# Patient Record
Sex: FEMALE | Race: WHITE | NOT HISPANIC OR LATINO | Employment: OTHER | URBAN - METROPOLITAN AREA
[De-identification: names, ages, dates, MRNs, and addresses within clinical notes are randomized per-mention and may not be internally consistent; named-entity substitution may affect disease eponyms.]

---

## 2023-12-20 ENCOUNTER — APPOINTMENT (EMERGENCY)
Dept: RADIOLOGY | Facility: HOSPITAL | Age: 73
End: 2023-12-20
Payer: MEDICARE

## 2023-12-20 ENCOUNTER — HOSPITAL ENCOUNTER (OUTPATIENT)
Facility: HOSPITAL | Age: 73
Setting detail: OBSERVATION
Discharge: ANOTHER HOSPITAL | End: 2023-12-21
Attending: EMERGENCY MEDICINE | Admitting: STUDENT IN AN ORGANIZED HEALTH CARE EDUCATION/TRAINING PROGRAM
Payer: MEDICARE

## 2023-12-20 DIAGNOSIS — R56.9 SEIZURE-LIKE ACTIVITY (HCC): Primary | ICD-10-CM

## 2023-12-20 LAB
ALBUMIN SERPL BCP-MCNC: 3.7 G/DL (ref 3.5–5)
ALP SERPL-CCNC: 77 U/L (ref 34–104)
ALT SERPL W P-5'-P-CCNC: 11 U/L (ref 7–52)
ANION GAP SERPL CALCULATED.3IONS-SCNC: 5 MMOL/L
AST SERPL W P-5'-P-CCNC: 15 U/L (ref 13–39)
BASOPHILS # BLD AUTO: 0.04 THOUSANDS/ÂΜL (ref 0–0.1)
BASOPHILS NFR BLD AUTO: 1 % (ref 0–1)
BILIRUB SERPL-MCNC: 0.4 MG/DL (ref 0.2–1)
BUN SERPL-MCNC: 16 MG/DL (ref 5–25)
CALCIUM SERPL-MCNC: 9 MG/DL (ref 8.4–10.2)
CHLORIDE SERPL-SCNC: 104 MMOL/L (ref 96–108)
CO2 SERPL-SCNC: 30 MMOL/L (ref 21–32)
CREAT SERPL-MCNC: 0.67 MG/DL (ref 0.6–1.3)
EOSINOPHIL # BLD AUTO: 0.11 THOUSAND/ÂΜL (ref 0–0.61)
EOSINOPHIL NFR BLD AUTO: 2 % (ref 0–6)
ERYTHROCYTE [DISTWIDTH] IN BLOOD BY AUTOMATED COUNT: 12 % (ref 11.6–15.1)
FLUAV RNA RESP QL NAA+PROBE: NEGATIVE
FLUBV RNA RESP QL NAA+PROBE: NEGATIVE
GFR SERPL CREATININE-BSD FRML MDRD: 88 ML/MIN/1.73SQ M
GLUCOSE SERPL-MCNC: 88 MG/DL (ref 65–140)
GLUCOSE SERPL-MCNC: 94 MG/DL (ref 65–140)
HCT VFR BLD AUTO: 41.5 % (ref 34.8–46.1)
HGB BLD-MCNC: 14.4 G/DL (ref 11.5–15.4)
IMM GRANULOCYTES # BLD AUTO: 0.01 THOUSAND/UL (ref 0–0.2)
IMM GRANULOCYTES NFR BLD AUTO: 0 % (ref 0–2)
LYMPHOCYTES # BLD AUTO: 1.87 THOUSANDS/ÂΜL (ref 0.6–4.47)
LYMPHOCYTES NFR BLD AUTO: 29 % (ref 14–44)
MCH RBC QN AUTO: 32.7 PG (ref 26.8–34.3)
MCHC RBC AUTO-ENTMCNC: 34.7 G/DL (ref 31.4–37.4)
MCV RBC AUTO: 94 FL (ref 82–98)
MONOCYTES # BLD AUTO: 0.49 THOUSAND/ÂΜL (ref 0.17–1.22)
MONOCYTES NFR BLD AUTO: 8 % (ref 4–12)
NEUTROPHILS # BLD AUTO: 3.93 THOUSANDS/ÂΜL (ref 1.85–7.62)
NEUTS SEG NFR BLD AUTO: 60 % (ref 43–75)
NRBC BLD AUTO-RTO: 0 /100 WBCS
PLATELET # BLD AUTO: 166 THOUSANDS/UL (ref 149–390)
PMV BLD AUTO: 9.9 FL (ref 8.9–12.7)
POTASSIUM SERPL-SCNC: 3.7 MMOL/L (ref 3.5–5.3)
PROT SERPL-MCNC: 7.7 G/DL (ref 6.4–8.4)
RBC # BLD AUTO: 4.4 MILLION/UL (ref 3.81–5.12)
RSV RNA RESP QL NAA+PROBE: NEGATIVE
SARS-COV-2 RNA RESP QL NAA+PROBE: NEGATIVE
SODIUM SERPL-SCNC: 139 MMOL/L (ref 135–147)
WBC # BLD AUTO: 6.45 THOUSAND/UL (ref 4.31–10.16)

## 2023-12-20 PROCEDURE — 80177 DRUG SCRN QUAN LEVETIRACETAM: CPT | Performed by: EMERGENCY MEDICINE

## 2023-12-20 PROCEDURE — 99285 EMERGENCY DEPT VISIT HI MDM: CPT

## 2023-12-20 PROCEDURE — 0241U HB NFCT DS VIR RESP RNA 4 TRGT: CPT | Performed by: EMERGENCY MEDICINE

## 2023-12-20 PROCEDURE — 36415 COLL VENOUS BLD VENIPUNCTURE: CPT | Performed by: EMERGENCY MEDICINE

## 2023-12-20 PROCEDURE — 82306 VITAMIN D 25 HYDROXY: CPT | Performed by: STUDENT IN AN ORGANIZED HEALTH CARE EDUCATION/TRAINING PROGRAM

## 2023-12-20 PROCEDURE — 99285 EMERGENCY DEPT VISIT HI MDM: CPT | Performed by: EMERGENCY MEDICINE

## 2023-12-20 PROCEDURE — 80053 COMPREHEN METABOLIC PANEL: CPT | Performed by: EMERGENCY MEDICINE

## 2023-12-20 PROCEDURE — 93005 ELECTROCARDIOGRAM TRACING: CPT

## 2023-12-20 PROCEDURE — 85025 COMPLETE CBC W/AUTO DIFF WBC: CPT | Performed by: EMERGENCY MEDICINE

## 2023-12-20 PROCEDURE — 84443 ASSAY THYROID STIM HORMONE: CPT | Performed by: STUDENT IN AN ORGANIZED HEALTH CARE EDUCATION/TRAINING PROGRAM

## 2023-12-20 PROCEDURE — 82948 REAGENT STRIP/BLOOD GLUCOSE: CPT

## 2023-12-20 PROCEDURE — 96374 THER/PROPH/DIAG INJ IV PUSH: CPT

## 2023-12-20 PROCEDURE — 70450 CT HEAD/BRAIN W/O DYE: CPT

## 2023-12-20 PROCEDURE — 96375 TX/PRO/DX INJ NEW DRUG ADDON: CPT

## 2023-12-20 PROCEDURE — 96376 TX/PRO/DX INJ SAME DRUG ADON: CPT

## 2023-12-20 PROCEDURE — G1004 CDSM NDSC: HCPCS

## 2023-12-20 RX ORDER — ONDANSETRON 2 MG/ML
4 INJECTION INTRAMUSCULAR; INTRAVENOUS ONCE
Status: COMPLETED | OUTPATIENT
Start: 2023-12-20 | End: 2023-12-20

## 2023-12-20 RX ORDER — LORAZEPAM 2 MG/ML
1 INJECTION INTRAMUSCULAR ONCE
Status: COMPLETED | OUTPATIENT
Start: 2023-12-20 | End: 2023-12-20

## 2023-12-20 RX ORDER — DIPHENHYDRAMINE HYDROCHLORIDE 25 MG/1
100 CAPSULE ORAL DAILY
COMMUNITY

## 2023-12-20 RX ORDER — LORAZEPAM 2 MG/ML
2 INJECTION INTRAMUSCULAR ONCE
Status: DISCONTINUED | OUTPATIENT
Start: 2023-12-20 | End: 2023-12-20

## 2023-12-20 RX ORDER — MIDAZOLAM 5 MG/.1ML
1 SPRAY NASAL AS NEEDED
COMMUNITY
Start: 2023-09-26 | End: 2023-12-21

## 2023-12-20 RX ORDER — CARVEDILOL 12.5 MG/1
6.25 TABLET ORAL 2 TIMES DAILY
COMMUNITY
Start: 2023-10-10

## 2023-12-20 RX ORDER — LEVETIRACETAM 500 MG/1
750 TABLET ORAL 2 TIMES DAILY
COMMUNITY
Start: 2023-11-16

## 2023-12-20 RX ORDER — DIAZEPAM 5 MG/1
5 TABLET ORAL 2 TIMES DAILY
COMMUNITY
Start: 2023-10-17

## 2023-12-20 RX ADMIN — ONDANSETRON 4 MG: 2 INJECTION INTRAMUSCULAR; INTRAVENOUS at 21:13

## 2023-12-20 RX ADMIN — LEVETIRACETAM 2500 MG: 100 INJECTION, SOLUTION INTRAVENOUS at 21:38

## 2023-12-20 RX ADMIN — LORAZEPAM 1 MG: 2 INJECTION INTRAMUSCULAR; INTRAVENOUS at 19:52

## 2023-12-20 RX ADMIN — LORAZEPAM 1 MG: 2 INJECTION INTRAMUSCULAR; INTRAVENOUS at 21:16

## 2023-12-20 RX ADMIN — LEVETIRACETAM 1500 MG: 100 INJECTION, SOLUTION INTRAVENOUS at 19:31

## 2023-12-20 NOTE — ED PROVIDER NOTES
"History  Chief Complaint   Patient presents with    Seizure - Prior Hx Of     Patient comes via EMS after having more than one seizure at home     Pt is a 74yo F who presents for seizure.  Much of history provided by daughter.  Daughter reports that several years ago patient had a traumatic brain injury and subsequently developed seizures following that.  Patient has been on Keppra and takes her Keppra regularly.  Daughter states that approximately every 6 weeks patient has a seizure.  Patient's typical seizures are unilateral vision loss and right-sided weakness.  Patient is typically aphasic postictally and then recovers back to baseline.  Daughter states that patient had one of her typical seizures this evening, however prior to returning to baseline had to \"grand mal\" seizures as well.  Daughter states that patient's whole body was shaking and she was nonresponsive.  She states that this is never happened before and therefore she called an ambulance.  Patient has been feeling well with no recent illness.  Patient is not in any fevers or chills.  Patient takes all of her medications regularly.        Prior to Admission Medications   Prescriptions Last Dose Informant Patient Reported? Taking?   Midazolam (Nayzilam) 5 MG/0.1ML SOLN 2023 at 1750  Yes Yes   Si Dose by Intranasal route if needed   Pyridoxine HCl (vitamin B-6) 25 MG tablet   Yes Yes   Sig: Take 100 mg by mouth daily   apixaban (Eliquis) 2.5 mg   Yes Yes   Sig: Take 2.5 mg by mouth 2 (two) times a day   carvedilol (COREG) 12.5 mg tablet   Yes Yes   Sig: Take 6.25 mg by mouth 2 (two) times a day   diazepam (VALIUM) 5 mg tablet   Yes Yes   Sig: Take 5 mg by mouth 2 (two) times a day   levETIRAcetam (KEPPRA) 500 mg tablet   Yes Yes   Sig: Take 750 mg by mouth 2 (two) times a day      Facility-Administered Medications: None       Past Medical History:   Diagnosis Date    Epilepsy (HCC)     post craniotomy    Hypertension     Seizures (HCC)  "       Past Surgical History:   Procedure Laterality Date    BRAIN SURGERY      CRANIOTOMY         History reviewed. No pertinent family history.  I have reviewed and agree with the history as documented.    E-Cigarette/Vaping    E-Cigarette Use Never User      E-Cigarette/Vaping Substances     Social History     Tobacco Use    Smoking status: Never    Smokeless tobacco: Never   Vaping Use    Vaping status: Never Used   Substance Use Topics    Alcohol use: Never    Drug use: Never       Review of Systems   Neurological:  Positive for seizures and speech difficulty.   All other systems reviewed and are negative.      Physical Exam  Physical Exam  Vitals reviewed.   Constitutional:       Appearance: She is well-developed. She is not diaphoretic.   HENT:      Head: Normocephalic and atraumatic.      Right Ear: External ear normal.      Left Ear: External ear normal.      Nose: Nose normal.      Mouth/Throat:      Pharynx: Oropharynx is clear.   Eyes:      Extraocular Movements: Extraocular movements intact.      Pupils: Pupils are equal, round, and reactive to light.   Cardiovascular:      Rate and Rhythm: Normal rate and regular rhythm.      Heart sounds: Normal heart sounds.   Pulmonary:      Effort: Pulmonary effort is normal. No respiratory distress.      Breath sounds: Normal breath sounds. No wheezing or rales.   Abdominal:      General: There is no distension.      Palpations: Abdomen is soft.      Tenderness: There is no abdominal tenderness.   Musculoskeletal:         General: Normal range of motion.      Cervical back: Normal range of motion and neck supple.   Skin:     General: Skin is warm and dry.      Capillary Refill: Capillary refill takes less than 2 seconds.      Coloration: Skin is not pale.      Findings: No erythema or rash.   Neurological:      Mental Status: She is alert.      Comments: Oriented to person but not to place or time  Intermittently answering questions appropriately and intermittently  aphasic  Following simple commands  Moving all extremities, weakness on right when compared to left   Psychiatric:         Speech: Speech normal.         Behavior: Behavior is cooperative.         Vital Signs  ED Triage Vitals [12/20/23 1824]   Temperature Pulse Respirations Blood Pressure SpO2   97.5 °F (36.4 °C) 78 20 135/100 99 %      Temp Source Heart Rate Source Patient Position - Orthostatic VS BP Location FiO2 (%)   Temporal Monitor Lying Left arm --      Pain Score       No Pain           Vitals:    12/20/23 1824 12/20/23 2030 12/20/23 2200 12/20/23 2330   BP: 135/100 (!) 179/77 135/65 142/67   Pulse: 78 82 79 80   Patient Position - Orthostatic VS: Lying            Visual Acuity      ED Medications  Medications   levETIRAcetam (KEPPRA) 1,500 mg in sodium chloride 0.9 % 100 mL IVPB (0 mg Intravenous Stopped 12/20/23 1946)   LORazepam (ATIVAN) injection 1 mg (1 mg Intravenous Given 12/20/23 1952)   LORazepam (ATIVAN) injection 1 mg (1 mg Intravenous Given 12/20/23 2116)   levETIRAcetam (KEPPRA) 2,500 mg in sodium chloride 0.9 % 250 mL IVPB (0 mg Intravenous Stopped 12/20/23 2153)   ondansetron (ZOFRAN) injection 4 mg (4 mg Intravenous Given 12/20/23 2113)       Diagnostic Studies  Results Reviewed       Procedure Component Value Units Date/Time    Levetiracetam level [[id number]] Collected: 12/20/23 2100    Lab Status: In process Specimen: Blood from Arm, Left Updated: 12/20/23 2105    FLU/RSV/COVID - if FLU/RSV clinically relevant [[id number]]  (Normal) Collected: 12/20/23 1844    Lab Status: Final result Specimen: Nares from Nose Updated: 12/20/23 1941     SARS-CoV-2 Negative     INFLUENZA A PCR Negative     INFLUENZA B PCR Negative     RSV PCR Negative    Narrative:      FOR PEDIATRIC PATIENTS - copy/paste COVID Guidelines URL to browser: [email or web address]    SARS-CoV-2 assay is a Nucleic Acid Amplification assay intended for the  qualitative detection  of nucleic acid from SARS-CoV-2 in nasopharyngeal  swabs. Results are for the presumptive identification of SARS-CoV-2 RNA.    Positive results are indicative of infection with SARS-CoV-2, the virus  causing COVID-19, but do not rule out bacterial infection or co-infection  with other viruses. Laboratories within the [place] and its  territories are required to report all positive results to the appropriate  public health authorities. Negative results do not preclude SARS-CoV-2  infection and should not be used as the sole basis for treatment or other  patient management decisions. Negative results must be combined with  clinical observations, patient history, and epidemiological information.  This test has not been FDA cleared or approved.    This test has been authorized by FDA under an Emergency Use Authorization  (EUA). This test is only authorized for the duration of time the  declaration that circumstances exist justifying the authorization of the  emergency use of an in vitro diagnostic tests for detection of SARS-CoV-2  virus and/or diagnosis of COVID-19 infection under section 564(b)(1) of  the Act, 21 U.S.C. 360bbb-3(b)(1), unless the authorization is terminated  or revoked sooner. The test has been validated but independent review by FDA  and CLIA is pending.    Test performed using [employer] GeneXpert: This RT-PCR assay targets N2,  a region unique to SARS-CoV-2. A conserved region in the E-gene was chosen  for pan-Sarbecovirus detection which includes SARS-CoV-2.    According to CMS-2020-01-R, this platform meets the definition of high-throughput technology.    Comprehensive metabolic panel [[id number]] Collected: 12/20/23 1844    Lab Status: Final result Specimen: Blood from Arm, Right Updated: 12/20/23 1910     Sodium 139 mmol/L      Potassium 3.7 mmol/L      Chloride 104 mmol/L      CO2 30 mmol/L      ANION GAP 5 mmol/L      BUN 16 mg/dL      Creatinine 0.67 mg/dL      Glucose 88 mg/dL       Calcium 9.0 mg/dL      AST 15 U/L      ALT 11 U/L      Alkaline Phosphatase 77 U/L      Total Protein 7.7 g/dL      Albumin 3.7 g/dL      Total Bilirubin 0.40 mg/dL      eGFR 88 ml/min/1.73sq m     Narrative:      National Kidney Disease Foundation guidelines for Chronic Kidney Disease (CKD):     Stage 1 with normal or high GFR (GFR > 90 mL/min/1.73 square meters)    Stage 2 Mild CKD (GFR = 60-89 mL/min/1.73 square meters)    Stage 3A Moderate CKD (GFR = 45-59 mL/min/1.73 square meters)    Stage 3B Moderate CKD (GFR = 30-44 mL/min/1.73 square meters)    Stage 4 Severe CKD (GFR = 15-29 mL/min/1.73 square meters)    Stage 5 End Stage CKD (GFR <15 mL/min/1.73 square meters)  Note: GFR calculation is accurate only with a steady state creatinine    CBC and differential [[id number]] Collected: 12/20/23 1844    Lab Status: Final result Specimen: Blood from Arm, Right Updated: 12/20/23 1851     WBC 6.45 Thousand/uL      RBC 4.40 Million/uL      Hemoglobin 14.4 g/dL      Hematocrit 41.5 %      MCV 94 fL      MCH 32.7 pg      MCHC 34.7 g/dL      RDW 12.0 %      MPV 9.9 fL      Platelets 166 Thousands/uL      nRBC 0 /100 WBCs      Neutrophils Relative 60 %      Immat GRANS % 0 %      Lymphocytes Relative 29 %      Monocytes Relative 8 %      Eosinophils Relative 2 %      Basophils Relative 1 %      Neutrophils Absolute 3.93 Thousands/µL      Immature Grans Absolute 0.01 Thousand/uL      Lymphocytes Absolute 1.87 Thousands/µL      Monocytes Absolute 0.49 Thousand/µL      Eosinophils Absolute 0.11 Thousand/µL      Basophils Absolute 0.04 Thousands/µL     Fingerstick Glucose (POCT) [[id number]]  (Normal) Collected: 12/20/23 1823    Lab Status: Final result Updated: 12/20/23 1832     POC Glucose 94 mg/dl                    CT head wo contrast   Final Result by Gustavo Murcia MD (12/20 2138)      No acute intracranial abnormality. Encephalomalacia at the left temporo-occipital region                     Workstation performed:  [id number]                    Procedures  Procedures         ED Course  ED Course as of 12/21/23 0055   Wed Dec 20, 2023   1853 CBC and differential  Reviewed and without actionable derangement.    1910 Comprehensive metabolic panel  Reviewed and without actionable derangement.    1943 Pt with increased shaking and reporting difficulty moving R side. Daughter states that she typically has difficulty moving R side during seizures but they do not usually happen this frequently. Patient states that this feels different than her normal seizure. Pt able to answer simple but speech not at baseline. Pt able to move all extremities but has definitve weakness on the R when compared to initial exam. Will give Ativan and reassess.    1945 FLU/RSV/COVID - if FLU/RSV clinically relevant  Negative.    2003 Neuro contacted via TT.    2013 Pt's daughter requesting transfer to [facility] as that is where pt follows with epileptology. Will place transfer order and discuss for possible transfer.    2045 Discussed with patient access at [facility]. Will get ahold of neurologist and call back.    2103 Daughter stating 40 second full body shaking. By the time I entered the room, symptoms had subsided. Pt answering questions, following commands, and improved strength on R side when compared to earlier evaluation when daughter reported seizure. [facility] neurology following recommended full 4g Keppra load, will give additional 2.5g. Pt also now complaining of nausea, will give Zofran.    2106 Awaiting call back from [facility] for possible transfer.    2148 CT head wo contrast  No acute intracranial abnormality. Encephalomalacia at the left temporo-occipital region   2214 Discussed with [facility] epileptology who will see pt in consult if accepted by hospitalist for admission. Awaiting discussion with hospitalist.    2219 Pt accepted by medicine Dr. Polanco at [facility].                                SBIRT 20yo+      Flowsheet Row Most Recent Value    Initial Alcohol Screen: US AUDIT-C     1. How often do you have a drink containing alcohol? 0 Filed at: 12/20/2023 1831   2. How many drinks containing alcohol do you have on a typical day you are drinking?  0 Filed at: 12/20/2023 1831   3a. Male UNDER 65: How often do you have five or more drinks on one occasion? 0 Filed at: 12/20/2023 1831   3b. FEMALE Any Age, or MALE 65+: How often do you have 4 or more drinks on one occassion? 0 Filed at: 12/20/2023 1831   Audit-C Score 0 Filed at: 12/20/2023 1831   DENISHA: How many times in the past year have you...    Used an illegal drug or used a prescription medication for non-medical reasons? Never Filed at: 12/20/2023 1831                      Medical Decision Making  Pt is a 72yo F who presents with seizure.     Differential diagnosis to include but not limited to breakthrough seizure, hypoglycemia, electrolyte abnormality, infection.  Will plan for basic labs and CT head.  Will load with Keppra and treat symptomatically.  Patient does have unilateral weakness, however this is reportedly normal with her seizures and is fluctuant while in the ED.  While stroke is on the differential, will not call stroke alert at this time based on patient's history.  See ED course for results and details.    Plan to transfer patient to [facility]. Still awaiting transport at time of sign out. Pt signed out to oncoming physician.        Amount and/or Complexity of Data Reviewed  Labs: ordered. Decision-making details documented in ED Course.  Radiology: ordered. Decision-making details documented in ED Course.    Risk  Prescription drug management.             Disposition  Final diagnoses:   Seizure-like activity (HCC)     Time reflects when diagnosis was documented in both MDM as applicable and the Disposition within this note       Time User Action Codes Description Comment    12/20/2023  9:13 PM Phuong Hermosillo Add [R56.9] Seizure-like activity (HCC)           ED Disposition       ED  Disposition   Transfer to Another Facility - Out of Network    Condition   --    Date/Time   Wed Dec 20, 2023 2222    Comment   Emily Martines should be transferred out to [facility].               MD Documentation      Flowsheet Row Most Recent Value   Patient Condition The patient has been stabilized such that within reasonable medical probability, no material deterioration of the patient condition or the condition of the unborn child(isabel) is likely to result from the transfer   Reason for Transfer Level of Care needed not available at this facility, Patient/Family request   Benefits of Transfer Specialized equipment and/or services available at the receiving facility (Include comment)________________________  [Epileptology]   Risks of Transfer Potential for delay in receiving treatment, Potential deterioration of medical condition, Loss of IV, Increased discomfort during transfer, Possible worsening of condition or death during transfer   Accepting Physician Collin   Accepting Facility [facility], [facility]  [facility]   Sending MD Hermosillo   Provider Certification General risk, such as traffic hazards, adverse weather conditions, rough terrain or turbulence, possible failure of equipment (including vehicle or aircraft), or consequences of actions of persons outside the control of the transport personnel          RN Documentation      Flowsheet Row Most Recent Value   Accepting Facility Name, City & State  [facility]          Follow-up Information    None         Patient's Medications   Discharge Prescriptions    No medications on file       No discharge procedures on file.    PDMP Review       None            ED Provider  Electronically Signed by             Phuong Hermosillo MD  12/21/23 0054

## 2023-12-21 VITALS
SYSTOLIC BLOOD PRESSURE: 119 MMHG | HEART RATE: 82 BPM | HEIGHT: 67 IN | OXYGEN SATURATION: 96 % | BODY MASS INDEX: 34.53 KG/M2 | DIASTOLIC BLOOD PRESSURE: 52 MMHG | RESPIRATION RATE: 18 BRPM | WEIGHT: 220 LBS | TEMPERATURE: 98.1 F

## 2023-12-21 PROBLEM — Z98.890 HISTORY OF CRANIOTOMY: Status: ACTIVE | Noted: 2023-12-21

## 2023-12-21 PROBLEM — R56.9 SEIZURE-LIKE ACTIVITY (HCC): Status: ACTIVE | Noted: 2023-12-21

## 2023-12-21 PROBLEM — Z86.711 HISTORY OF PULMONARY EMBOLUS (PE): Status: ACTIVE | Noted: 2023-12-21

## 2023-12-21 PROBLEM — I10 HYPERTENSION: Status: ACTIVE | Noted: 2023-12-21

## 2023-12-21 LAB
ATRIAL RATE: 81 BPM
ATRIAL RATE: 83 BPM
P AXIS: 54 DEGREES
P AXIS: 67 DEGREES
PR INTERVAL: 170 MS
PR INTERVAL: 176 MS
QRS AXIS: -7 DEGREES
QRS AXIS: -9 DEGREES
QRSD INTERVAL: 82 MS
QRSD INTERVAL: 84 MS
QT INTERVAL: 364 MS
QT INTERVAL: 380 MS
QTC INTERVAL: 427 MS
QTC INTERVAL: 441 MS
T WAVE AXIS: 50 DEGREES
T WAVE AXIS: 55 DEGREES
TSH SERPL DL<=0.05 MIU/L-ACNC: 1.2 UIU/ML (ref 0.45–4.5)
VENTRICULAR RATE: 81 BPM
VENTRICULAR RATE: 83 BPM

## 2023-12-21 PROCEDURE — 99236 HOSP IP/OBS SAME DATE HI 85: CPT | Performed by: STUDENT IN AN ORGANIZED HEALTH CARE EDUCATION/TRAINING PROGRAM

## 2023-12-21 PROCEDURE — NC001 PR NO CHARGE: Performed by: FAMILY MEDICINE

## 2023-12-21 RX ORDER — ONDANSETRON 2 MG/ML
4 INJECTION INTRAMUSCULAR; INTRAVENOUS EVERY 6 HOURS PRN
Status: DISCONTINUED | OUTPATIENT
Start: 2023-12-21 | End: 2023-12-22 | Stop reason: HOSPADM

## 2023-12-21 RX ORDER — CARVEDILOL 6.25 MG/1
6.25 TABLET ORAL 2 TIMES DAILY
Status: DISCONTINUED | OUTPATIENT
Start: 2023-12-21 | End: 2023-12-22 | Stop reason: HOSPADM

## 2023-12-21 RX ORDER — LEVETIRACETAM 500 MG/1
750 TABLET ORAL 2 TIMES DAILY
Status: DISCONTINUED | OUTPATIENT
Start: 2023-12-21 | End: 2023-12-22 | Stop reason: HOSPADM

## 2023-12-21 RX ORDER — LORAZEPAM 2 MG/ML
1 INJECTION INTRAMUSCULAR EVERY 4 HOURS PRN
Status: DISCONTINUED | OUTPATIENT
Start: 2023-12-21 | End: 2023-12-22 | Stop reason: HOSPADM

## 2023-12-21 RX ORDER — ACETAMINOPHEN 325 MG/1
650 TABLET ORAL EVERY 6 HOURS PRN
Status: DISCONTINUED | OUTPATIENT
Start: 2023-12-21 | End: 2023-12-22 | Stop reason: HOSPADM

## 2023-12-21 RX ORDER — LORAZEPAM 2 MG/ML
1 INJECTION INTRAMUSCULAR EVERY 4 HOURS PRN
Status: SHIPPED | OUTPATIENT
Start: 2023-12-21

## 2023-12-21 RX ORDER — PYRIDOXINE HCL (VITAMIN B6) 50 MG
100 TABLET ORAL DAILY
Status: DISCONTINUED | OUTPATIENT
Start: 2023-12-21 | End: 2023-12-22 | Stop reason: HOSPADM

## 2023-12-21 RX ORDER — DIAZEPAM 5 MG/1
5 TABLET ORAL 2 TIMES DAILY
Status: DISCONTINUED | OUTPATIENT
Start: 2023-12-21 | End: 2023-12-22 | Stop reason: HOSPADM

## 2023-12-21 RX ADMIN — LEVETIRACETAM 750 MG: 500 TABLET, FILM COATED ORAL at 17:10

## 2023-12-21 RX ADMIN — APIXABAN 2.5 MG: 2.5 TABLET, FILM COATED ORAL at 14:19

## 2023-12-21 RX ADMIN — CARVEDILOL 6.25 MG: 6.25 TABLET, FILM COATED ORAL at 14:19

## 2023-12-21 RX ADMIN — DIAZEPAM 5 MG: 5 TABLET ORAL at 20:09

## 2023-12-21 RX ADMIN — Medication 100 MG: at 14:19

## 2023-12-21 RX ADMIN — LEVETIRACETAM 750 MG: 500 TABLET, FILM COATED ORAL at 14:19

## 2023-12-21 RX ADMIN — DIAZEPAM 5 MG: 5 TABLET ORAL at 14:19

## 2023-12-21 RX ADMIN — APIXABAN 2.5 MG: 2.5 TABLET, FILM COATED ORAL at 17:10

## 2023-12-21 NOTE — ASSESSMENT & PLAN NOTE
Patient had seizure-like activity more pronounced than her baseline which normally is unilateral vision loss with right-sided weakness; reports she normally gets a seizure once every 6 weeks  Daughter reported that she had grand mal seizure-like activity and called EMS  Patient presented to the emergency department for further evaluation and treatment  CT of the head was performed which showed no acute intracranial abnormality, encephalomalacia left temporal occipital region  No further seizure-like activity noted  Patient and family requesting transfer to [facility] where patient is established with neurology there; request placed, patient accepted however no beds available at this time  Patient was loaded with Keppra in the emergency department.  Will continue with Keppra 750 mg p.o. twice daily.  Patient reported that she had not been taking the 750 at home, was still only taking 500 mg.  Will continue patient's home medication Valium 5 mg p.o. twice daily.  Neurology consulted.  Seizure precautions.  Telemetry ordered.  Monitor.   RN cannot approve Refill Request    RN can NOT refill this medication med is not covered by policy/route to provider. Last office visit: 2/19/2019 Addy Ivy MD Last Physical: 9/10/2019 Last MTM visit: Visit date not found Last visit same specialty: 2/19/2019 Addy Ivy MD.  Next visit within 3 mo: Visit date not found  Next physical within 3 mo: Visit date not found      Keisha Wood, [facility] Triage/Med Refill 9/14/2019    Requested Prescriptions   Pending Prescriptions Disp Refills     ibuprofen (ADVIL,MOTRIN) 800 MG tablet [Pharmacy Med Name: IBUPROFEN 800MG TABLETS] 60 tablet 0     Sig: TAKE 1 TABLET BY MOUTH TWICE DAILY AS NEEDED       There is no refill protocol information for this order

## 2023-12-21 NOTE — ASSESSMENT & PLAN NOTE
Patient has a history of PE and DVT.  Has IVC filter.  Continue home medication Eliquis 2.5 mg p.o. twice daily  SCDs ordered  No leg pain or shortness of breath.

## 2023-12-21 NOTE — ED NOTES
Pt awake and alert. VSS. No c/o . Awaiting transfer to [facility]. Pt. Noted to have tic like movements. Per Mirian HARRIS this is normal for this pt.      Leah Lange RN  12/21/23 9806

## 2023-12-21 NOTE — EMTALA/ACUTE CARE TRANSFER
[facility] EMERGENCY DEPARTMENT  [address] [address]  Dept: [phone]      EMTALA TRANSFER CONSENT    NAME Emily Martines                                         1950                              MRN [id number]    I have been informed of my rights regarding examination, treatment, and transfer   by Dr. Phuong Hermosillo MD    Benefits: Specialized equipment and/or services available at the receiving facility (Include comment)________________________ (Epileptology)    Risks: Potential for delay in receiving treatment, Potential deterioration of medical condition, Loss of IV, Increased discomfort during transfer, Possible worsening of condition or death during transfer      Consent for Transfer:  I acknowledge that my medical condition has been evaluated and explained to me by the emergency department physician or other qualified medical person and/or my attending physician, who has recommended that I be transferred to the service of  Accepting Physician: Collin at Accepting Facility Name, City & State : [facility]. The above potential benefits of such transfer, the potential risks associated with such transfer, and the probable risks of not being transferred have been explained to me, and I fully understand them.  The doctor has explained that, in my case, the benefits of transfer outweigh the risks.  I agree to be transferred.    I authorize the performance of emergency medical procedures and treatments upon me in both transit and upon arrival at the receiving facility.  Additionally, I authorize the release of any and all medical records to the receiving facility and request they be transported with me, if possible.  I understand that the safest mode of transportation during a medical emergency is an ambulance and that the Hospital advocates the use of this mode of transport. Risks of traveling to the receiving facility by car, including absence of medical control,  life sustaining equipment, such as oxygen, and medical personnel has been explained to me and I fully understand them.    (CYNTHIA CORRECT BOX BELOW)  [  ]  I consent to the stated transfer and to be transported by ambulance/helicopter.  [  ]  I consent to the stated transfer, but refuse transportation by ambulance and accept full responsibility for my transportation by car.  I understand the risks of non-ambulance transfers and I exonerate the Hospital and its staff from any deterioration in my condition that results from this refusal.    X___________________________________________    DATE  23  TIME________  Signature of patient or legally responsible individual signing on patient behalf           RELATIONSHIP TO PATIENT_________________________          Provider Certification    NAME Emily Martines                                         1950                              MRN [id number]    A medical screening exam was performed on the above named patient.  Based on the examination:    Condition Necessitating Transfer The encounter diagnosis was Seizure-like activity (HCC).    Patient Condition: The patient has been stabilized such that within reasonable medical probability, no material deterioration of the patient condition or the condition of the unborn child(isabel) is likely to result from the transfer    Reason for Transfer: Level of Care needed not available at this facility, Patient/Family request    Transfer Requirements: [facility]   Space available and qualified personnel available for treatment as acknowledged by    Agreed to accept transfer and to provide appropriate medical treatment as acknowledged by       Collin  Appropriate medical records of the examination and treatment of the patient are provided at the time of transfer   STAFF INITIAL WHEN COMPLETED _______  Transfer will be performed by qualified personnel from    and appropriate transfer equipment as required, including the use  of necessary and appropriate life support measures.    Provider Certification: I have examined the patient and explained the following risks and benefits of being transferred/refusing transfer to the patient/family:  General risk, such as traffic hazards, adverse weather conditions, rough terrain or turbulence, possible failure of equipment (including vehicle or aircraft), or consequences of actions of persons outside the control of the transport personnel      Based on these reasonable risks and benefits to the patient and/or the unborn child(isabel), and based upon the information available at the time of the patient’s examination, I certify that the medical benefits reasonably to be expected from the provision of appropriate medical treatments at another medical facility outweigh the increasing risks, if any, to the individual’s medical condition, and in the case of labor to the unborn child, from effecting the transfer.    X____________________________________________ DATE 12/20/23        TIME_______      ORIGINAL - SEND TO MEDICAL RECORDS   COPY - SEND WITH PATIENT DURING TRANSFER

## 2023-12-21 NOTE — ASSESSMENT & PLAN NOTE
She has a history of craniotomy left temporal occipital region status post TBI in 2017.  Patient reports that she went to sit on a low table, missed and struck the left side of her head and developed a bleed which required craniotomy.  Reports after the surgery is when she started with having seizure activity.  Reports having some discomfort at the surgical incision site, reports Tylenol works well.  As needed Tylenol ordered.  Monitor.

## 2023-12-21 NOTE — H&P
[facility]  H&P  Name: Emily Martines 73 y.o. female I MRN: [id number]  Unit/Bed#: [address] [place] [address] Date of Admission: 12/20/2023   Date of Service: 12/21/2023  Hospital Day: 0      Assessment/Plan   * Seizure-like activity (HCC)  Assessment & Plan  Patient had seizure-like activity more pronounced than her baseline which normally is unilateral vision loss with right-sided weakness; reports she normally gets a seizure once every 6 weeks  Daughter reported that she had grand mal seizure-like activity and called EMS  Patient presented to the emergency department for further evaluation and treatment  CT of the head was performed which showed no acute intracranial abnormality, encephalomalacia left temporal occipital region  No further seizure-like activity noted  Patient and family requesting transfer to [facility] where patient is established with neurology there; request placed, patient accepted however no beds available at this time  Patient was loaded with Keppra in the emergency department.  Will continue with Keppra 750 mg p.o. twice daily.  Patient reported that she had not been taking the 750 at home, was still only taking 500 mg.  Will continue patient's home medication Valium 5 mg p.o. twice daily.  Neurology consulted.  Seizure precautions.  Telemetry ordered.  Monitor.    History of craniotomy  Assessment & Plan  She has a history of craniotomy left temporal occipital region status post TBI in 2017.  Patient reports that she went to sit on a low table, missed and struck the left side of her head and developed a bleed which required craniotomy.  Reports after the surgery is when she started with having seizure activity.  Reports having some discomfort at the surgical incision site, reports Tylenol works well.  As needed Tylenol ordered.  Monitor.    Hypertension  Assessment & Plan  Patient has a history of hypertension, will continue Coreg 6.25 mg p.o. twice daily.  Vital  signs as per unit routine  Monitor      History of pulmonary embolus (PE)  Assessment & Plan  Patient has a history of PE and DVT.  Has IVC filter.  Continue home medication Eliquis 2.5 mg p.o. twice daily  SCDs ordered  No leg pain or shortness of breath.           VTE Pharmacologic Prophylaxis:   Moderate Risk (Score 3-4) - Pharmacological DVT Prophylaxis Ordered: apixaban (Eliquis).  Code Status: Level 3 - DNAR and DNI   Discussion with family: Updated  (daughter) via phone.    Anticipated Length of Stay: Patient will be admitted on an observation basis with an anticipated length of stay of less than 2 midnights secondary to seizure activity, loaded with Kepp.    Total Time Spent on Date of Encounter in care of patient: greater than 75 mins. This time was spent on one or more of the following: performing physical exam; counseling and coordination of care; obtaining or reviewing history; documenting in the medical record; reviewing/ordering tests, medications or procedures; communicating with other healthcare professionals and discussing with patient's family/caregivers.    Chief Complaint: witnessed seizure activity    History of Present Illness:  Emily Martines is a 73 y.o. female with a PMH of  w traumatic brain injury status post left craniotomy, seizures post TBI, hypertension, DVT and PE status post IVC filter on anticoagulation who presents with witnessed seizure activity.  Patient has a history of seizures status post traumatic brain injury/craniotomy 2017 which normally present as right-sided weakness and unilateral vision loss.  On day of presentation to the ED patient was witnessed to have seizure-like activity that progressed to grand mal seizure like activity.  EMS was summonsed and patient presented to the emergency department for further evaluation and treatment.  She was loaded with Keppra IV given IV Ativan and Zofran for nausea.  CT of the head was performed which showed no  intracranial abnormality, encephalomalacia left temporal occipital region as per radiology report.  EKG showed sinus rhythm with some PVCs.  Labs were all within normal limits.  Keppra level was sent, pending.  Patient and family are requesting transfer to [facility] in [facility] as that is where she has established care for her seizure activity.  Request was made through PACS, patient accepted at [facility] however no beds available at this time.  Patient is admitted at this time at [facility] pending bed availability at [facility].  Neurology consulted.  Seizure precautions.  Continue patient's home medications Keppra and Valium.  Patient did report that she was supposed to be taking 750 mg twice daily however was still on the 500 mg dose as she was nervous about increasing the dosage.  Continue neurochecks.  This was discussed with the patient at the bedside and discussed with the daughter via phone, both verbalized understanding are agreeable to the plan.  Patient indicated that she is to be a DNR/DNI..    Review of Systems:  Review of Systems   Constitutional:  Negative for chills, fatigue and fever.   HENT: Negative.     Eyes: Negative.    Respiratory: Negative.     Cardiovascular: Negative.    Gastrointestinal:  Negative for diarrhea, nausea and vomiting.   Endocrine: Negative.    Genitourinary: Negative.    Musculoskeletal:         Ambulates with walker   Skin: Negative.    Allergic/Immunologic: Negative.    Neurological:  Positive for seizures and headaches.   Hematological: Negative.    Psychiatric/Behavioral: Negative.         Past Medical and Surgical History:   Past Medical History:   Diagnosis Date    Epilepsy (HCC)     post craniotomy    Hypertension     Seizures (HCC)        Past Surgical History:   Procedure Laterality Date    BRAIN SURGERY      CRANIOTOMY         Meds/Allergies:  Prior to Admission medications    Medication Sig Start Date End Date Taking? Authorizing Provider   apixaban  "(Eliquis) 2.5 mg Take 2.5 mg by mouth 2 (two) times a day 8/9/23  Yes Historical Provider, MD   carvedilol (COREG) 12.5 mg tablet Take 6.25 mg by mouth 2 (two) times a day 10/10/23  Yes Historical Provider, MD   diazepam (VALIUM) 5 mg tablet Take 5 mg by mouth 2 (two) times a day 10/17/23  Yes Historical Provider, MD   levETIRAcetam (KEPPRA) 500 mg tablet Take 750 mg by mouth 2 (two) times a day 11/16/23  Yes Historical Provider, MD   Midazolam (Nayzilam) 5 MG/0.1ML SOLN 1 Dose by Intranasal route if needed 9/26/23  Yes Historical Provider, MD   Pyridoxine HCl (vitamin B-6) 25 MG tablet Take 100 mg by mouth daily   Yes Historical Provider, MD     I have reviewed home medications with patient personally.    Allergies:   Allergies   Allergen Reactions    Doxycycline Hives    Morphine And Related Hives    Penicillins Hives       Social History:  Marital Status: /Civil Union   Occupation: Retired nurse  Patient Pre-hospital Living Situation: Home, With other family member: Daughter, son-in-law,  and grandson  Patient Pre-hospital Level of Mobility: walks with walker  Patient Pre-hospital Diet Restrictions: None  Substance Use History:   Social History     Substance and Sexual Activity   Alcohol Use Never     Social History     Tobacco Use   Smoking Status Never   Smokeless Tobacco Never     Social History     Substance and Sexual Activity   Drug Use Never       Family History:  History reviewed. No pertinent family history.    Physical Exam:     Vitals:   Blood Pressure: 130/88 (12/21/23 1300)  Pulse: 75 (12/21/23 1300)  Temperature: 98 °F (36.7 °C) (12/21/23 1300)  Temp Source: Temporal (12/20/23 1824)  Respirations: 20 (12/21/23 1300)  Height: 5' 7\" (170.2 cm) (12/21/23 1300)  Weight - Scale: 99.8 kg (220 lb) (12/21/23 1300)  SpO2: 93 % (12/21/23 1300)    Physical Exam  Vitals and nursing note reviewed.   Constitutional:       General: She is not in acute distress.     Comments: Some tremors noted; " patient reports she has had these since the seizure she had prior to admission    HENT:      Head: Normocephalic.   Cardiovascular:      Rate and Rhythm: Normal rate and regular rhythm.      Pulses: Normal pulses.      Heart sounds: Normal heart sounds.   Pulmonary:      Effort: Pulmonary effort is normal.      Breath sounds: Normal breath sounds.   Abdominal:      General: Bowel sounds are normal. There is no distension.      Palpations: Abdomen is soft.      Tenderness: There is no abdominal tenderness.   Genitourinary:     Comments: Voiding spontaneously   Musculoskeletal:         General: Normal range of motion.      Right lower leg: No edema.      Left lower leg: No edema.   Skin:     Capillary Refill: Capillary refill takes less than 2 seconds.      Comments: Venous stasis discoloration bilateral LEs    Neurological:      Mental Status: She is alert and oriented to person, place, and time.      Comments: Occasionally grasps for a word, but then remembers; reports this is her baseline   Psychiatric:         Mood and Affect: Mood normal.         Behavior: Behavior normal.         Thought Content: Thought content normal.         Judgment: Judgment normal.        Additional Data:     Lab Results:  Results from last 7 days   Lab Units 12/20/23  1844   WBC Thousand/uL 6.45   HEMOGLOBIN g/dL 14.4   HEMATOCRIT % 41.5   PLATELETS Thousands/uL 166   NEUTROS PCT % 60   LYMPHS PCT % 29   MONOS PCT % 8   EOS PCT % 2     Results from last 7 days   Lab Units 12/20/23  1844   SODIUM mmol/L 139   POTASSIUM mmol/L 3.7   CHLORIDE mmol/L 104   CO2 mmol/L 30   BUN mg/dL 16   CREATININE mg/dL 0.67   ANION GAP mmol/L 5   CALCIUM mg/dL 9.0   ALBUMIN g/dL 3.7   TOTAL BILIRUBIN mg/dL 0.40   ALK PHOS U/L 77   ALT U/L 11   AST U/L 15   GLUCOSE RANDOM mg/dL 88         Results from last 7 days   Lab Units 12/20/23  1823   POC GLUCOSE mg/dl 94               Lines/Drains:  Invasive Devices       Peripheral Intravenous Line  Duration              Peripheral IV 12/20/23 Left Antecubital <1 day              Drain  Duration             External Urinary Catheter <1 day                        Imaging: Reviewed radiology reports from this admission including: CT head  CT head wo contrast   Final Result by Gustavo Murcia MD (12/20 2138)      No acute intracranial abnormality. Encephalomalacia at the left temporo-occipital region                     Workstation performed: [id number]             EKG and Other Studies Reviewed on Admission:   EKG:  Sinus rhythm 81.    ** Please Note: This note has been constructed using a voice recognition system. **

## 2023-12-21 NOTE — ED NOTES
This RN spoke to Rony hernandes about extra information on pts allergies. Rony Hernandes was not able to provide any extra information reguarding medications/ allergies     Mirian Colin RN  12/20/23 2025

## 2023-12-21 NOTE — ASSESSMENT & PLAN NOTE
Patient has a history of hypertension, will continue Coreg 6.25 mg p.o. twice daily.  Vital signs as per unit routine  Monitor

## 2023-12-22 LAB — 25(OH)D3 SERPL-MCNC: 25.2 NG/ML (ref 30–100)

## 2023-12-22 NOTE — ASSESSMENT & PLAN NOTE
She has a history of craniotomy left temporal occipital region status post TBI in 2017.  Patient reports that she went to sit on a low table, missed and struck the left side of her head and developed a bleed which required craniotomy.  Reports after the surgery is when she started having seizure activity.  Reports having some discomfort at the surgical incision site, reports Tylenol works well.  As needed Tylenol ordered.

## 2023-12-22 NOTE — [place] TRANSF[place] FOR[place]
"[place]  (ALL ITEMS MUST BE COMPLETED)    1. TRANSFER FROM: [facility]      TRANSFER TO: [facility]    2. DATE OF TRANSFER: 12/21/2023                        TIME OF TRANSFER: 2300    3. PATIENT NAME: Emily Martines,        YOB: 1950                             GENDER: female    4. LANGUAGE:   English    5. PHYSICIAN NAME:  Susie Mcguire MD                   PHONE: [phone]    6. CODE STATUS: Level 3 - DNAR and DNI        Out of Hospital DNR Attached: Yes    7. :                                      :  Extended Emergency Contact Information  Primary Emergency Contact: bruna maloney  Mobile Phone: [phone]  Relation: Daughter           Health Care Representative/Proxy:  Yes           Legal Guardian:  No             NAME OF:           HEALTH CARE REPRESENTATIVE/PROXY:                                         OR           LEGAL GUARDIAN, IF NOT :                                               PHONE:  (Day)           (Night)                        (Cell)    8. REASON FOR TRANSFER: (Must include brief medical history and recent changes in physical function or cognition.) Higher level of care            V/S: /52   Pulse 82   Temp 98.1 °F (36.7 °C)   Resp 18   Ht 5' 7\" (1.702 m)   Wt 99.8 kg (220 lb)   SpO2 96%   BMI 34.46 kg/m²           PAIN: None    9. PRIMARY DIAGNOSIS: Seizure-like activity (HCC)      Secondary Diagnosis:         Pacemaker: No      Internal Defib: No          Mental Health Diagnosis (if Applicable):    10. RESTRAINTS: No     11. RESPIRATORY NEEDS: None    12. ISOLATION/PRECAUTION: None    13. ALLERGY: Doxycycline, Morphine and related, and Penicillins    14. SENSORY:       Vision Good    15. SKIN CONDITION: No Wounds    16. DIET: Regular    17. IV ACCESS: Saline Lock    18. PERSONAL ITEMS SENT WITH PATIENT: OtherClothing, Cell phone and "     19. ATTACHED DOCUMENTS: MUST ATTACH CURRENT MEDICATION INFORMATION Face Sheet, MAR, Medication Reconciliation, Diagnostic Studies, and Labs    20. AT RISK ALERTS:Falls and Seizures        HARM TO: N/A    21. WEIGHT BEARING STATUS:         Left Leg: Full        Right Leg: Full    22. MENTAL STATUS:Alert and Oriented    23. FUNCTION:        Walk: With Help        Transfer: With Help        Toilet: With Help        Feed: With Help    24. IMMUNIZATIONS/SCREENING:     There is no immunization history on file for this patient.    25. BOWEL: Continent    26. BLADDER: Continent    27. SENDING FACILITY CONTACT: Lila Ferguson RN                  Title: RN        Unit: [address] [place] [address]        Phone: [phone]          REC'G FACILITY CONTACT ( Lashaun)        Title:RN        Unit:  52        Phone:[phone]         FORM PREFILLED BY ( Alex Hodge)       Title:RN       Unit: [address] [place] [address]       Phone: [phone]        FORM COMPLETED BY Alex Hodge RN      Title: RN      Phone: [phone]

## 2023-12-22 NOTE — ASSESSMENT & PLAN NOTE
Patient had seizure-like activity more pronounced than her baseline which normally is unilateral vision loss with right-sided weakness; reports she normally gets a seizure once every 6 weeks  Daughter reported that she had grand mal seizure-like activity and called EMS  CT of the head was performed which showed no acute intracranial abnormality, encephalomalacia left temporal occipital region  No further seizure-like activity noted  Patient and family requesting transfer to [facility] where patient is established with neurology there.  Patient currently has a bed and will be transferred there   Patient was loaded with Keppra in the emergency department.  Will continue with Keppra 750 mg p.o. twice daily.  Patient reported that she had not been taking the 750 at home, was still only taking 500 mg.  Continue patient's home medication Valium 5 mg p.o. twice daily.  Seizure precautions.

## 2023-12-22 NOTE — PLAN OF CARE
Problem: Prexisting or High Potential for Compromised Skin Integrity  Goal: Skin integrity is maintained or improved  Description: INTERVENTIONS:  - Identify patients at risk for skin breakdown  - Assess and monitor skin integrity  - Assess and monitor nutrition and hydration status  - Monitor labs   - Assess for incontinence   - Turn and reposition patient  - Assist with mobility/ambulation  - Relieve pressure over bony prominences  - Avoid friction and shearing  - Provide appropriate hygiene as needed including keeping skin clean and dry  - Evaluate need for skin moisturizer/barrier cream  - Collaborate with interdisciplinary team   - Patient/family teaching  - Consider wound care consult   Outcome: Progressing     Problem: PAIN - ADULT  Goal: Verbalizes/displays adequate comfort level or baseline comfort level  Description: Interventions:  - Encourage patient to monitor pain and request assistance  - Assess pain using appropriate pain scale  - Administer analgesics based on type and severity of pain and evaluate response  - Implement non-pharmacological measures as appropriate and evaluate response  - Consider cultural and social influences on pain and pain management  - Notify physician/advanced practitioner if interventions unsuccessful or patient reports new pain  Outcome: Progressing     Problem: INFECTION - ADULT  Goal: Absence or prevention of progression during hospitalization  Description: INTERVENTIONS:  - Assess and monitor for signs and symptoms of infection  - Monitor lab/diagnostic results  - Monitor all insertion sites, i.e. indwelling lines, tubes, and drains  - Monitor endotracheal if appropriate and nasal secretions for changes in amount and color  - [place] appropriate cooling/warming therapies per order  - Administer medications as ordered  - Instruct and encourage patient and family to use good hand hygiene technique  - Identify and instruct in appropriate isolation precautions for  identified infection/condition  Outcome: Progressing     Problem: SAFETY ADULT  Goal: Patient will remain free of falls  Description: INTERVENTIONS:  - Educate patient/family on patient safety including physical limitations  - Instruct patient to call for assistance with activity   - Consult OT/PT to assist with strengthening/mobility   - Keep Call bell within reach  - Keep bed low and locked with side rails adjusted as appropriate  - Keep care items and personal belongings within reach  - Initiate and maintain comfort rounds  - Make Fall Risk Sign visible to staff  - Offer Toileting every 2 Hours, in advance of need  - Initiate/Maintain bed alarm  - Obtain necessary fall risk management equipment: bed alarm   - Apply yellow socks and bracelet for high fall risk patients  - Consider moving patient to room near nurses station  Outcome: Progressing  Goal: Maintain or return to baseline ADL function  Description: INTERVENTIONS:  -  Assess patient's ability to carry out ADLs; assess patient's baseline for ADL function and identify physical deficits which impact ability to perform ADLs (bathing, care of mouth/teeth, toileting, grooming, dressing, etc.)  - Assess/evaluate cause of self-care deficits   - Assess range of motion  - Assess patient's mobility; develop plan if impaired  - Assess patient's need for assistive devices and provide as appropriate  - Encourage maximum independence but intervene and supervise when necessary  - Involve family in performance of ADLs  - Assess for home care needs following discharge   - Consider OT consult to assist with ADL evaluation and planning for discharge  - Provide patient education as appropriate  Outcome: Progressing  Goal: Maintains/Returns to pre admission functional level  Description: INTERVENTIONS:  - Perform AM-PAC 6 Click Basic Mobility/ Daily Activity assessment daily.  - Set and communicate daily mobility goal to care team and patient/family/caregiver.   - Collaborate  with rehabilitation services on mobility goals if consulted  - Perform Range of Motion 3 times a day.  - Reposition patient every 2 hours.  - Dangle patient 3 times a day  - Stand patient 3 times a day  - Ambulate patient 3 times a day  - Out of bed to chair 3 times a day   - Out of bed for meals 3 times a day  - Out of bed for toileting  - Record patient progress and toleration of activity level   Outcome: Progressing     Problem: DISCHARGE PLANNING  Goal: Discharge to home or other facility with appropriate resources  Description: INTERVENTIONS:  - Identify barriers to discharge w/patient and caregiver  - Arrange for needed discharge resources and transportation as appropriate  - Identify discharge learning needs (meds, wound care, etc.)  - Arrange for interpretive services to assist at discharge as needed  - Refer to Case Management Department for coordinating discharge planning if the patient needs post-hospital services based on physician/advanced practitioner order or complex needs related to functional status, cognitive ability, or social support system  Outcome: Progressing     Problem: Knowledge Deficit  Goal: Patient/family/caregiver demonstrates understanding of disease process, treatment plan, medications, and discharge instructions  Description: Complete learning assessment and assess knowledge base.  Interventions:  - Provide teaching at level of understanding  - Provide teaching via preferred learning methods  Outcome: Progressing

## 2023-12-22 NOTE — [place]ERS[place] TRANSF[place] FORM
"[place] UNIV[place] TRANSF[place] FOR[place]  (ALL ITEMS MUST BE COMPLETED)    1. TRANSFER FROM: [facility]      TRANSFER TO: [facility]    2. DATE OF TRANSFER: 12/21/2023                        TIME OF TRANSFER: 2300    3. PATIENT NAME: Emily Martines,        YOB: 1950                             GENDER: female    4. LANGUAGE:   English    5. PHYSICIAN NAME:  Susie Mcguire MD                   PHONE: [phone]    6. CODE STATUS: Level 3 - DNAR and DNI        Out of Hospital DNR Attached: Yes    7. :                                      :  Extended Emergency Contact Information  Primary Emergency Contact: bruna maloney  Mobile Phone: [phone]  Relation: Daughter           Health Care Representative/Proxy:  Yes           Legal Guardian:  No             NAME OF:           HEALTH CARE REPRESENTATIVE/PROXY:                                         OR           LEGAL GUARDIAN, IF NOT :                                               PHONE:  (Day)           (Night)                        (Cell)    8. REASON FOR TRANSFER: (Must include brief medical history and recent changes in physical function or cognition.) Higher level of care            V/S: /52   Pulse 82   Temp 98.1 °F (36.7 °C)   Resp 18   Ht 5' 7\" (1.702 m)   Wt 99.8 kg (220 lb)   SpO2 96%   BMI 34.46 kg/m²           PAIN: None    9. PRIMARY DIAGNOSIS: Seizure-like activity (HCC)      Secondary Diagnosis:         Pacemaker: No      Internal Defib: No          Mental Health Diagnosis (if Applicable):    10. RESTRAINTS: No     11. RESPIRATORY NEEDS: None    12. ISOLATION/PRECAUTION: None    13. ALLERGY: Doxycycline, Morphine and related, and Penicillins    14. SENSORY:       Vision Good    15. SKIN CONDITION: No Wounds    16. DIET: Regular    17. IV ACCESS: Saline Lock    18. PERSONAL ITEMS SENT WITH PATIENT: OtherClothing, Cell phone and "     19. ATTACHED DOCUMENTS: MUST ATTACH CURRENT MEDICATION INFORMATION Face Sheet, MAR, Medication Reconciliation, Diagnostic Studies, and Labs    20. AT RISK ALERTS:Falls and Seizures        HARM TO: N/A    21. WEIGHT BEARING STATUS:         Left Leg: Full        Right Leg: Full    22. MENTAL STATUS:Alert and Oriented    23. FUNCTION:        Walk: With Help        Transfer: With Help        Toilet: With Help        Feed: With Help    24. IMMUNIZATIONS/SCREENING:     There is no immunization history on file for this patient.    25. BOWEL: Continent    26. BLADDER: Continent    27. SENDING FACILITY CONTACT: Lila Ferguson RN                  Title: RN        Unit: [address] [place] [address]        Phone: [phone]          REC'G FACILITY CONTACT ( Lashaun)        Title:RN        Unit:  52        Phone:[phone]         FORM PREFILLED BY ( Alex Hodge)       Title:RN       Unit: [address] [place] [address]       Phone: [phone]        FORM COMPLETED BY Alex Hodge RN      Title: RN      Phone: [phone]

## 2023-12-22 NOTE — ASSESSMENT & PLAN NOTE
Patient has a history of PE and DVT.  Has IVC filter.  Continue home medication Eliquis 2.5 mg p.o. twice daily  No leg pain or shortness of breath.

## 2023-12-22 NOTE — [place] UNIVERSAL TRANSFER FORM
"[place] UNIVERSAL TRANSFER FOR[place]  (ALL ITEMS MUST BE COMPLETED)    1. TRANSFER FROM: [facility]      TRANSFER TO: [facility]    2. DATE OF TRANSFER: 12/21/2023                        TIME OF TRANSFER: 2300    3. PATIENT NAME: Emily Martines,        YOB: 1950                             GENDER: female    4. LANGUAGE:   English    5. PHYSICIAN NAME:  Susie Mcguire MD                   PHONE: [phone]    6. CODE STATUS: Level 3 - DNAR and DNI        Out of Hospital DNR Attached: Yes    7. :                                      :  Extended Emergency Contact Information  Primary Emergency Contact: bruna maloney  Mobile Phone: [phone]  Relation: Daughter           Health Care Representative/Proxy:  Yes           Legal Guardian:  No             NAME OF:           HEALTH CARE REPRESENTATIVE/PROXY:                                         OR           LEGAL GUARDIAN, IF NOT :                                               PHONE:  (Day)           (Night)                        (Cell)    8. REASON FOR TRANSFER: (Must include brief medical history and recent changes in physical function or cognition.) Higher level of care            V/S: /52   Pulse 82   Temp 98.1 °F (36.7 °C)   Resp 18   Ht 5' 7\" (1.702 m)   Wt 99.8 kg (220 lb)   SpO2 96%   BMI 34.46 kg/m²           PAIN: None    9. PRIMARY DIAGNOSIS: Seizure-like activity (HCC)      Secondary Diagnosis:         Pacemaker: No      Internal Defib: No          Mental Health Diagnosis (if Applicable):    10. RESTRAINTS: No     11. RESPIRATORY NEEDS: None    12. ISOLATION/PRECAUTION: None    13. ALLERGY: Doxycycline, Morphine and related, and Penicillins    14. SENSORY:       Vision Good    15. SKIN CONDITION: No Wounds    16. DIET: Regular    17. IV ACCESS: Saline Lock    18. PERSONAL ITEMS SENT WITH PATIENT: OtherClothing, Cell phone and "     19. ATTACHED DOCUMENTS: MUST ATTACH CURRENT MEDICATION INFORMATION Face Sheet, MAR, Medication Reconciliation, Diagnostic Studies, and Labs    20. AT RISK ALERTS:Falls and Seizures        HARM TO: N/A    21. WEIGHT BEARING STATUS:         Left Leg: Full        Right Leg: Full    22. MENTAL STATUS:Alert and Oriented    23. FUNCTION:        Walk: With Help        Transfer: With Help        Toilet: With Help        Feed: With Help    24. IMMUNIZATIONS/SCREENING:     There is no immunization history on file for this patient.    25. BOWEL: Continent    26. BLADDER: Continent    27. SENDING FACILITY CONTACT: Lila Ferguson RN                  Title: RN        Unit: [address] [place] [address]        Phone: [phone]          REC'G FACILITY CONTACT ( Lashaun)        Title:RN        Unit:  52        Phone:[phone]         FORM PREFILLED BY ( Alex Hodge)       Title:RN       Unit: [address] [place] [address]       Phone: [phone]        FORM COMPLETED BY Alex Hodge RN      Title: RN      Phone: [phone]

## 2023-12-22 NOTE — NURSING NOTE
Pt transported on stretcher with transport team to [facility]. Report given to receiving facility RNLashaun. Pt belongings sent with transport team.

## 2023-12-22 NOTE — DISCHARGE SUMMARY
[facility]  Discharge- Emily Martines 1950, 73 y.o. female MRN: [id number]  Unit/Bed#: [address] [place] [address] Encounter: [id number]  Primary Care Provider: No primary care provider on file.   Date and time admitted to hospital: 12/20/2023  6:18 PM    * Seizure-like activity (HCC)  Assessment & Plan  Patient had seizure-like activity more pronounced than her baseline which normally is unilateral vision loss with right-sided weakness; reports she normally gets a seizure once every 6 weeks  Daughter reported that she had grand mal seizure-like activity and called EMS  CT of the head was performed which showed no acute intracranial abnormality, encephalomalacia left temporal occipital region  No further seizure-like activity noted  Patient and family requesting transfer to [facility] where patient is established with neurology there.  Patient currently has a bed and will be transferred there   Patient was loaded with Keppra in the emergency department.  Will continue with Keppra 750 mg p.o. twice daily.  Patient reported that she had not been taking the 750 at home, was still only taking 500 mg.  Continue patient's home medication Valium 5 mg p.o. twice daily.  Seizure precautions.    Hypertension  Assessment & Plan  Patient has a history of hypertension, will continue Coreg 6.25 mg p.o. twice daily.      History of pulmonary embolus (PE)  Assessment & Plan  Patient has a history of PE and DVT.  Has IVC filter.  Continue home medication Eliquis 2.5 mg p.o. twice daily  No leg pain or shortness of breath.    History of craniotomy  Assessment & Plan  She has a history of craniotomy left temporal occipital region status post TBI in 2017.  Patient reports that she went to sit on a low table, missed and struck the left side of her head and developed a bleed which required craniotomy.  Reports after the surgery is when she started having seizure activity.  Reports having some discomfort at the  surgical incision site, reports Tylenol works well.  As needed Tylenol ordered.      Medical Problems       Resolved Problems  Date Reviewed: 12/21/2023   None       Discharging Physician / Practitioner: Shayla Banks DO  PCP: No primary care provider on file.  Admission Date:   Admission Orders (From admission, onward)       Ordered        12/21/23 1142  Place in Observation  Once                          Discharge Date: 12/21/23    Consultations During Hospital Stay:  None    Procedures Performed:   None    Significant Findings / Test Results:   See above    Incidental Findings:   None    Test Results Pending at Discharge (will require follow up):   Levetiracetam level     Outpatient Tests Requested:  N/A    Complications: None    Reason for Admission: Seizure    Hospital Course:   Emily Martines is a 73 y.o. female patient who originally presented to the hospital on 12/20/2023 due to Witnessed seizure activity.  Patient has known history of seizures status post TBI/craniotomy in 2017 which normally presents as right-sided weakness and unilateral vision loss.  This seizure activity progressed to grand mal seizure and EMS was called.  She was loaded with IV Keppra and given IV Ativan.  Patient/family requesting transfer to [facility] as she has established care for her seizures there.  Per notes it appears that patient was supposed to be taking 750 mg of Keppra but was still taking 500 mg dose.  Patient currently has a bed at [facility] and is being transferred    Please see above list of diagnoses and related plan for additional information.     Condition at Discharge: stable    Discharge Day Visit / Exam:   * Please refer to separate H &P for these details *    Discharge instructions/Information to patient and family:   See after visit summary for information provided to patient and family.      Provisions for Follow-Up Care:  See after visit summary for information related to follow-up care and  any pertinent home health orders.      Mobility at time of Discharge:   Basic Mobility Inpatient Raw Score: 12  JH-HLM Goal: 4: Move to chair/commode  JH-HLM Achieved: 2: Bed activities/Dependent transfer  HLM Goal NOT achieved. Continue to encourage mobility in post discharge setting.     Disposition:   Acute Care Hospital Transfer to [facility]    Planned Readmission: no     Discharge Statement:  I spent 20 minutes discharging the patient. This time was spent on the day of discharge. I had direct contact with the patient on the day of discharge. Greater than 50% of the total time was spent examining patient, answering all patient questions, arranging and discussing plan of care with patient as well as directly providing post-discharge instructions.  Additional time then spent on discharge activities.    Discharge Medications:  See after visit summary for reconciled discharge medications provided to patient and/or family.      **Please Note: This note may have been constructed using a voice recognition system**

## 2023-12-26 LAB — LEVETIRACETAM SERPL-MCNC: 31.7 UG/ML (ref 10–40)

## 2024-10-04 ENCOUNTER — HOSPITAL ENCOUNTER (INPATIENT)
Facility: HOSPITAL | Age: 74
LOS: 3 days | Discharge: HOME | DRG: 871 | End: 2024-10-07
Attending: EMERGENCY MEDICINE | Admitting: FAMILY MEDICINE
Payer: COMMERCIAL

## 2024-10-04 ENCOUNTER — APPOINTMENT (EMERGENCY)
Dept: RADIOLOGY | Facility: HOSPITAL | Age: 74
DRG: 871 | End: 2024-10-04
Payer: COMMERCIAL

## 2024-10-04 DIAGNOSIS — N12 PYELONEPHRITIS: Primary | ICD-10-CM

## 2024-10-04 PROBLEM — G40.909 SEIZURE DISORDER (HCC): Status: ACTIVE | Noted: 2024-10-04

## 2024-10-04 PROBLEM — G93.41 ACUTE METABOLIC ENCEPHALOPATHY: Status: ACTIVE | Noted: 2024-10-04

## 2024-10-04 PROBLEM — D69.6 THROMBOCYTOPENIA (HCC): Status: ACTIVE | Noted: 2024-10-04

## 2024-10-04 PROBLEM — Z87.820 HISTORY OF TRAUMATIC BRAIN INJURY: Status: ACTIVE | Noted: 2024-10-04

## 2024-10-04 PROBLEM — A41.9 SEPSIS (HCC): Status: ACTIVE | Noted: 2024-10-04

## 2024-10-04 PROBLEM — E83.42 HYPOMAGNESEMIA: Status: ACTIVE | Noted: 2024-10-04

## 2024-10-04 LAB
2HR DELTA HS TROPONIN: -3 NG/L
ALBUMIN SERPL BCG-MCNC: 3.7 G/DL (ref 3.5–5)
ALP SERPL-CCNC: 69 U/L (ref 34–104)
ALT SERPL W P-5'-P-CCNC: 11 U/L (ref 7–52)
ANION GAP SERPL CALCULATED.3IONS-SCNC: 7 MMOL/L (ref 4–13)
APTT PPP: 25 SECONDS (ref 23–34)
AST SERPL W P-5'-P-CCNC: 19 U/L (ref 13–39)
BACTERIA UR QL AUTO: ABNORMAL /HPF
BASOPHILS # BLD MANUAL: 0 THOUSAND/UL (ref 0–0.1)
BASOPHILS NFR MAR MANUAL: 0 % (ref 0–1)
BILIRUB SERPL-MCNC: 1.19 MG/DL (ref 0.2–1)
BILIRUB UR QL STRIP: NEGATIVE
BUN SERPL-MCNC: 16 MG/DL (ref 5–25)
CALCIUM SERPL-MCNC: 8.9 MG/DL (ref 8.4–10.2)
CARDIAC TROPONIN I PNL SERPL HS: 4 NG/L
CARDIAC TROPONIN I PNL SERPL HS: 7 NG/L
CHLORIDE SERPL-SCNC: 102 MMOL/L (ref 96–108)
CLARITY UR: ABNORMAL
CO2 SERPL-SCNC: 26 MMOL/L (ref 21–32)
COLOR UR: YELLOW
CREAT SERPL-MCNC: 0.91 MG/DL (ref 0.6–1.3)
EOSINOPHIL # BLD MANUAL: 0 THOUSAND/UL (ref 0–0.4)
EOSINOPHIL NFR BLD MANUAL: 0 % (ref 0–6)
ERYTHROCYTE [DISTWIDTH] IN BLOOD BY AUTOMATED COUNT: 12.1 % (ref 11.6–15.1)
FLUAV AG UPPER RESP QL IA.RAPID: NEGATIVE
FLUBV AG UPPER RESP QL IA.RAPID: NEGATIVE
GFR SERPL CREATININE-BSD FRML MDRD: 62 ML/MIN/1.73SQ M
GLUCOSE SERPL-MCNC: 104 MG/DL (ref 65–140)
GLUCOSE SERPL-MCNC: 112 MG/DL (ref 65–140)
GLUCOSE UR STRIP-MCNC: NEGATIVE MG/DL
HCT VFR BLD AUTO: 40.6 % (ref 34.8–46.1)
HGB BLD-MCNC: 13.4 G/DL (ref 11.5–15.4)
HGB UR QL STRIP.AUTO: ABNORMAL
INR PPP: 1.15 (ref 0.85–1.19)
KETONES UR STRIP-MCNC: NEGATIVE MG/DL
LACTATE SERPL-SCNC: 1 MMOL/L (ref 0.5–2)
LEUKOCYTE ESTERASE UR QL STRIP: ABNORMAL
LYMPHOCYTES # BLD AUTO: 0.21 THOUSAND/UL (ref 0.6–4.47)
LYMPHOCYTES # BLD AUTO: 2 % (ref 14–44)
MAGNESIUM SERPL-MCNC: 1.4 MG/DL (ref 1.9–2.7)
MCH RBC QN AUTO: 32 PG (ref 26.8–34.3)
MCHC RBC AUTO-ENTMCNC: 33 G/DL (ref 31.4–37.4)
MCV RBC AUTO: 97 FL (ref 82–98)
MONOCYTES # BLD AUTO: 0.11 THOUSAND/UL (ref 0–1.22)
MONOCYTES NFR BLD: 1 % (ref 4–12)
NEUTROPHILS # BLD MANUAL: 10.37 THOUSAND/UL (ref 1.85–7.62)
NEUTS BAND NFR BLD MANUAL: 7 % (ref 0–8)
NEUTS SEG NFR BLD AUTO: 90 % (ref 43–75)
NITRITE UR QL STRIP: POSITIVE
NON-SQ EPI CELLS URNS QL MICRO: ABNORMAL /HPF
PH UR STRIP.AUTO: 7 [PH]
PLATELET # BLD AUTO: 124 THOUSANDS/UL (ref 149–390)
PLATELET BLD QL SMEAR: ABNORMAL
PMV BLD AUTO: 9.9 FL (ref 8.9–12.7)
POTASSIUM SERPL-SCNC: 3.7 MMOL/L (ref 3.5–5.3)
PROT SERPL-MCNC: 7.6 G/DL (ref 6.4–8.4)
PROT UR STRIP-MCNC: ABNORMAL MG/DL
PROTHROMBIN TIME: 15.2 SECONDS (ref 12.3–15)
RBC # BLD AUTO: 4.19 MILLION/UL (ref 3.81–5.12)
RBC #/AREA URNS AUTO: ABNORMAL /HPF
RBC MORPH BLD: NORMAL
SARS-COV+SARS-COV-2 AG RESP QL IA.RAPID: NEGATIVE
SODIUM SERPL-SCNC: 135 MMOL/L (ref 135–147)
SP GR UR STRIP.AUTO: 1.01 (ref 1–1.03)
UROBILINOGEN UR STRIP-ACNC: <2 MG/DL
WBC # BLD AUTO: 10.69 THOUSAND/UL (ref 4.31–10.16)
WBC #/AREA URNS AUTO: ABNORMAL /HPF

## 2024-10-04 PROCEDURE — 87040 BLOOD CULTURE FOR BACTERIA: CPT | Performed by: EMERGENCY MEDICINE

## 2024-10-04 PROCEDURE — 83735 ASSAY OF MAGNESIUM: CPT | Performed by: EMERGENCY MEDICINE

## 2024-10-04 PROCEDURE — 83605 ASSAY OF LACTIC ACID: CPT | Performed by: EMERGENCY MEDICINE

## 2024-10-04 PROCEDURE — 85730 THROMBOPLASTIN TIME PARTIAL: CPT | Performed by: EMERGENCY MEDICINE

## 2024-10-04 PROCEDURE — 99285 EMERGENCY DEPT VISIT HI MDM: CPT | Performed by: EMERGENCY MEDICINE

## 2024-10-04 PROCEDURE — 81001 URINALYSIS AUTO W/SCOPE: CPT | Performed by: EMERGENCY MEDICINE

## 2024-10-04 PROCEDURE — 93005 ELECTROCARDIOGRAM TRACING: CPT

## 2024-10-04 PROCEDURE — 74177 CT ABD & PELVIS W/CONTRAST: CPT

## 2024-10-04 PROCEDURE — 82948 REAGENT STRIP/BLOOD GLUCOSE: CPT

## 2024-10-04 PROCEDURE — 99285 EMERGENCY DEPT VISIT HI MDM: CPT

## 2024-10-04 PROCEDURE — 87186 SC STD MICRODIL/AGAR DIL: CPT | Performed by: EMERGENCY MEDICINE

## 2024-10-04 PROCEDURE — 87086 URINE CULTURE/COLONY COUNT: CPT | Performed by: EMERGENCY MEDICINE

## 2024-10-04 PROCEDURE — 80053 COMPREHEN METABOLIC PANEL: CPT | Performed by: EMERGENCY MEDICINE

## 2024-10-04 PROCEDURE — 87804 INFLUENZA ASSAY W/OPTIC: CPT | Performed by: EMERGENCY MEDICINE

## 2024-10-04 PROCEDURE — 96365 THER/PROPH/DIAG IV INF INIT: CPT

## 2024-10-04 PROCEDURE — 85027 COMPLETE CBC AUTOMATED: CPT | Performed by: EMERGENCY MEDICINE

## 2024-10-04 PROCEDURE — 85610 PROTHROMBIN TIME: CPT | Performed by: EMERGENCY MEDICINE

## 2024-10-04 PROCEDURE — 71045 X-RAY EXAM CHEST 1 VIEW: CPT

## 2024-10-04 PROCEDURE — 85007 BL SMEAR W/DIFF WBC COUNT: CPT | Performed by: EMERGENCY MEDICINE

## 2024-10-04 PROCEDURE — 36415 COLL VENOUS BLD VENIPUNCTURE: CPT | Performed by: EMERGENCY MEDICINE

## 2024-10-04 PROCEDURE — 99223 1ST HOSP IP/OBS HIGH 75: CPT | Performed by: INTERNAL MEDICINE

## 2024-10-04 PROCEDURE — 87154 CUL TYP ID BLD PTHGN 6+ TRGT: CPT | Performed by: EMERGENCY MEDICINE

## 2024-10-04 PROCEDURE — 87077 CULTURE AEROBIC IDENTIFY: CPT | Performed by: EMERGENCY MEDICINE

## 2024-10-04 PROCEDURE — 87811 SARS-COV-2 COVID19 W/OPTIC: CPT | Performed by: EMERGENCY MEDICINE

## 2024-10-04 PROCEDURE — 84484 ASSAY OF TROPONIN QUANT: CPT | Performed by: EMERGENCY MEDICINE

## 2024-10-04 RX ORDER — ACETAMINOPHEN 10 MG/ML
1000 INJECTION, SOLUTION INTRAVENOUS ONCE
Status: COMPLETED | OUTPATIENT
Start: 2024-10-04 | End: 2024-10-04

## 2024-10-04 RX ORDER — ONDANSETRON 2 MG/ML
4 INJECTION INTRAMUSCULAR; INTRAVENOUS EVERY 4 HOURS PRN
Status: DISCONTINUED | OUTPATIENT
Start: 2024-10-04 | End: 2024-10-07 | Stop reason: HOSPADM

## 2024-10-04 RX ORDER — PYRIDOXINE HCL (VITAMIN B6) 50 MG
100 TABLET ORAL DAILY
Status: DISCONTINUED | OUTPATIENT
Start: 2024-10-05 | End: 2024-10-07 | Stop reason: HOSPADM

## 2024-10-04 RX ORDER — LORAZEPAM 0.5 MG/1
2.5 TABLET ORAL AS NEEDED
COMMUNITY

## 2024-10-04 RX ORDER — DIPHENHYDRAMINE HYDROCHLORIDE 50 MG/ML
25 INJECTION INTRAMUSCULAR; INTRAVENOUS ONCE
Status: COMPLETED | OUTPATIENT
Start: 2024-10-04 | End: 2024-10-04

## 2024-10-04 RX ORDER — ACETAMINOPHEN 325 MG/1
650 TABLET ORAL EVERY 6 HOURS PRN
Status: DISCONTINUED | OUTPATIENT
Start: 2024-10-04 | End: 2024-10-07 | Stop reason: HOSPADM

## 2024-10-04 RX ORDER — LEVETIRACETAM 500 MG/5ML
750 INJECTION, SOLUTION, CONCENTRATE INTRAVENOUS ONCE
Status: COMPLETED | OUTPATIENT
Start: 2024-10-04 | End: 2024-10-04

## 2024-10-04 RX ORDER — ACETAMINOPHEN 325 MG/1
650 TABLET ORAL ONCE
Status: DISCONTINUED | OUTPATIENT
Start: 2024-10-04 | End: 2024-10-04

## 2024-10-04 RX ORDER — LEVOFLOXACIN 5 MG/ML
500 INJECTION, SOLUTION INTRAVENOUS ONCE
Status: COMPLETED | OUTPATIENT
Start: 2024-10-04 | End: 2024-10-04

## 2024-10-04 RX ORDER — LEVETIRACETAM 500 MG/1
750 TABLET ORAL 2 TIMES DAILY
Status: DISCONTINUED | OUTPATIENT
Start: 2024-10-04 | End: 2024-10-04

## 2024-10-04 RX ORDER — LEVETIRACETAM 500 MG/1
750 TABLET ORAL 2 TIMES DAILY
Status: DISCONTINUED | OUTPATIENT
Start: 2024-10-05 | End: 2024-10-07 | Stop reason: HOSPADM

## 2024-10-04 RX ORDER — CARVEDILOL 6.25 MG/1
6.25 TABLET ORAL 2 TIMES DAILY
Status: DISCONTINUED | OUTPATIENT
Start: 2024-10-04 | End: 2024-10-07 | Stop reason: HOSPADM

## 2024-10-04 RX ORDER — LORAZEPAM 2 MG/ML
2 INJECTION INTRAMUSCULAR EVERY 4 HOURS PRN
Status: DISCONTINUED | OUTPATIENT
Start: 2024-10-04 | End: 2024-10-07 | Stop reason: HOSPADM

## 2024-10-04 RX ORDER — MAGNESIUM SULFATE HEPTAHYDRATE 40 MG/ML
2 INJECTION, SOLUTION INTRAVENOUS ONCE
Status: COMPLETED | OUTPATIENT
Start: 2024-10-04 | End: 2024-10-04

## 2024-10-04 RX ORDER — LORAZEPAM 2 MG/ML
0.5 INJECTION INTRAMUSCULAR ONCE
Status: COMPLETED | OUTPATIENT
Start: 2024-10-04 | End: 2024-10-04

## 2024-10-04 RX ORDER — CEFTRIAXONE 1 G/50ML
1000 INJECTION, SOLUTION INTRAVENOUS EVERY 24 HOURS
Status: DISCONTINUED | OUTPATIENT
Start: 2024-10-05 | End: 2024-10-05

## 2024-10-04 RX ADMIN — SODIUM CHLORIDE 500 ML: 0.9 INJECTION, SOLUTION INTRAVENOUS at 13:24

## 2024-10-04 RX ADMIN — LEVOFLOXACIN 500 MG: 500 INJECTION, SOLUTION INTRAVENOUS at 14:17

## 2024-10-04 RX ADMIN — LEVETIRACETAM 750 MG: 100 INJECTION, SOLUTION INTRAVENOUS at 16:38

## 2024-10-04 RX ADMIN — APIXABAN 2.5 MG: 2.5 TABLET, FILM COATED ORAL at 20:05

## 2024-10-04 RX ADMIN — IOHEXOL 100 ML: 350 INJECTION, SOLUTION INTRAVENOUS at 15:04

## 2024-10-04 RX ADMIN — MAGNESIUM SULFATE HEPTAHYDRATE 2 G: 40 INJECTION, SOLUTION INTRAVENOUS at 20:22

## 2024-10-04 RX ADMIN — SODIUM CHLORIDE 1000 ML: 0.9 INJECTION, SOLUTION INTRAVENOUS at 15:30

## 2024-10-04 RX ADMIN — LORAZEPAM 0.5 MG: 2 INJECTION INTRAMUSCULAR; INTRAVENOUS at 16:28

## 2024-10-04 RX ADMIN — CARVEDILOL 6.25 MG: 6.25 TABLET, FILM COATED ORAL at 20:05

## 2024-10-04 RX ADMIN — DIPHENHYDRAMINE HYDROCHLORIDE 25 MG: 50 INJECTION, SOLUTION INTRAMUSCULAR; INTRAVENOUS at 16:28

## 2024-10-04 RX ADMIN — ACETAMINOPHEN 1000 MG: 10 INJECTION INTRAVENOUS at 13:33

## 2024-10-04 NOTE — ASSESSMENT & PLAN NOTE
Continue Keppra - PRN IV Ativan on board for breakthrough episodes while hospitalized  Also on Clobazam at home (not available on hospital formulary)  History of traumatic brain injury status post craniotomy as precipitating factor noted

## 2024-10-04 NOTE — ASSESSMENT & PLAN NOTE
Presents with fever coupled with tachycardia/tachypnea and thrombocytopenia, in the setting of right pyelonephritis with cystitis (see below)  Also with a mild WBC count elevation, not high enough to qualify for SIRS criteria  Lactic acid normal  Blood cultures collected in the ED  CXR unremarkable  S/p IV fluid boluses in the ED - c/w maintenance infusion as hemodynamically stable  Goal MAP > 65

## 2024-10-04 NOTE — ASSESSMENT & PLAN NOTE
Transient alteration of mentation in the setting of sepsis from pyelonephritis/cystitis  Blood sugar stable  Electrolytes reviewed -> hypomagnesemia noted status post repletion  Mentation rapidly improving towards baseline  Serial neurochecks and IV fluid hydration, along with treatment of underlying infection  If mentation recurs or worsens, may consider CT imaging to rule out intracranial etiology (less likely clinical concern at this time)

## 2024-10-04 NOTE — ED NOTES
Voice massage left for daughter to bring her magnet to hospital.      Felicia Sheikh, RN  10/04/24 3415

## 2024-10-04 NOTE — ED PROVIDER NOTES
Final diagnoses:   Pyelonephritis     ED Disposition       ED Disposition   Admit    Condition   Stable    Date/Time   Fri Oct 4, 2024  3:30 PM    Comment                   Assessment & Plan       Medical Decision Making  Patient evaluated with EKG labs imaging admitted to the hospitalist service for further evaluation management.  Patient did develop a small rash around the IV site after the administration of Levaquin and she was given Benadryl and the rash improved.  She was also loaded with her antiseizure medicines.    Problems Addressed:  Pyelonephritis: acute illness or injury    Amount and/or Complexity of Data Reviewed  External Data Reviewed: notes.  Labs: ordered. Decision-making details documented in ED Course.  Radiology: ordered. Decision-making details documented in ED Course.  ECG/medicine tests: ordered and independent interpretation performed. Decision-making details documented in ED Course.    Risk  OTC drugs.  Prescription drug management.  Decision regarding hospitalization.             Medications   acetaminophen (Ofirmev) injection 1,000 mg (0 mg Intravenous Stopped 10/4/24 1424)   sodium chloride 0.9 % bolus 500 mL (0 mL Intravenous Stopped 10/4/24 1424)   levofloxacin (LEVAQUIN) IVPB (premix in dextrose) 500 mg 100 mL (0 mg Intravenous Stopped 10/4/24 1621)   iohexol (OMNIPAQUE) 350 MG/ML injection (MULTI-DOSE) 100 mL (100 mL Intravenous Given 10/4/24 1504)   sodium chloride 0.9 % bolus 1,000 mL (1,000 mL Intravenous New Bag 10/4/24 1530)   diphenhydrAMINE (BENADRYL) injection 25 mg (25 mg Intravenous Given 10/4/24 1628)   LORazepam (ATIVAN) injection 0.5 mg (0.5 mg Intravenous Given 10/4/24 1628)   levETIRAcetam (KEPPRA) injection 750 mg (750 mg Intravenous Given 10/4/24 1638)       ED Risk Strat Scores                                               History of Present Illness       Chief Complaint   Patient presents with    Fever     Brought in by family for confusion and weakness since early  this am. Noted to have high temp in triage. Also c/o burning on urination and strong smell to urine       Past Medical History:   Diagnosis Date    Epilepsy (HCC)     post craniotomy    Hypertension     Seizures (HCC)       Past Surgical History:   Procedure Laterality Date    BRAIN SURGERY      CRANIOTOMY        History reviewed. No pertinent family history.   Social History     Tobacco Use    Smoking status: Never    Smokeless tobacco: Never   Vaping Use    Vaping status: Never Used   Substance Use Topics    Alcohol use: Not Currently    Drug use: Never      E-Cigarette/Vaping    E-Cigarette Use Never User       E-Cigarette/Vaping Substances      I have reviewed and agree with the history as documented.     74-year-old female presents with confusion generalized weakness dysuria and frequency for the past couple of days.  Denies nausea vomiting cough congestion does admit to right-sided flank pain radiating to her right lower abdomen sharp continuous currently 4 out of 10 nothing makes it better or worse.      History provided by:  Patient   used: No        Review of Systems   Constitutional: Negative.    HENT: Negative.     Eyes: Negative.    Respiratory: Negative.     Cardiovascular: Negative.    Gastrointestinal: Negative.    Endocrine: Negative.    Genitourinary:  Positive for dysuria, flank pain, frequency and urgency.   Skin: Negative.    Allergic/Immunologic: Negative.    Neurological: Negative.    Hematological: Negative.    Psychiatric/Behavioral: Negative.     All other systems reviewed and are negative.          Objective       ED Triage Vitals [10/04/24 1231]   Temperature Pulse Blood Pressure Respirations SpO2 Patient Position - Orthostatic VS   (!) 103.1 °F (39.5 °C) 95 119/58 (!) 24 93 % Sitting      Temp Source Heart Rate Source BP Location FiO2 (%) Pain Score    Tympanic Monitor Left arm -- No Pain      Vitals      Date and Time Temp Pulse SpO2 Resp BP Pain Score FACES Pain  Rating User   10/04/24 2024 -- -- -- -- -- No Pain -- MCR   10/04/24 2001 -- -- -- 18 -- -- -- MCR   10/04/24 2001 98.3 °F (36.8 °C) 77 92 % -- 113/50 -- -- DII   10/04/24 1826 99.5 °F (37.5 °C) 76 94 % 18 115/49 -- -- DII   10/04/24 1745 98.2 °F (36.8 °C) 76 96 % 20 137/82 -- -- SF   10/04/24 1715 -- 72 95 % 24 126/82 -- -- AM   10/04/24 1645 -- 76 92 % 20 114/57 -- -- SF   10/04/24 1630 98.2 °F (36.8 °C) 80 92 % 20 130/63 -- -- SF   10/04/24 1600 -- 80 92 % 26 93/54 -- -- AM   10/04/24 1515 -- 84 90 % 18 104/51 -- -- SF   10/04/24 1500 99 °F (37.2 °C) 84 91 % 20 94/46 -- -- SF   10/04/24 1420 -- -- -- 22 -- -- -- SF   10/04/24 1420 99.6 °F (37.6 °C) 84 91 % -- 103/50 -- -- AM   10/04/24 1415 -- 84 91 % 22 113/43 -- -- SF   10/04/24 1400 -- 84 90 % 28 128/61 -- -- AM   10/04/24 1345 -- 86 92 % 20 134/63 -- -- SF   10/04/24 1315 -- 88 92 % 22 122/56 -- -- SF   10/04/24 1231 103.1 °F (39.5 °C) 95 93 % 24 119/58 No Pain -- LS            Physical Exam  Vitals and nursing note reviewed.   Constitutional:       Appearance: Normal appearance.   HENT:      Head: Normocephalic and atraumatic.      Nose: Nose normal.      Mouth/Throat:      Mouth: Mucous membranes are moist.   Eyes:      Extraocular Movements: Extraocular movements intact.      Pupils: Pupils are equal, round, and reactive to light.   Cardiovascular:      Rate and Rhythm: Normal rate and regular rhythm.   Pulmonary:      Effort: Pulmonary effort is normal.      Breath sounds: Normal breath sounds.   Abdominal:      General: Abdomen is flat. Bowel sounds are normal.      Palpations: Abdomen is soft.      Tenderness: There is abdominal tenderness.      Comments: Right-sided CVA tenderness   Musculoskeletal:         General: Normal range of motion.      Cervical back: Normal range of motion and neck supple.   Skin:     General: Skin is warm.      Capillary Refill: Capillary refill takes less than 2 seconds.   Neurological:      General: No focal deficit  present.      Mental Status: She is alert and oriented to person, place, and time. Mental status is at baseline.   Psychiatric:         Mood and Affect: Mood normal.         Thought Content: Thought content normal.         Results Reviewed       Procedure Component Value Units Date/Time    Blood culture #1 [[id number]] Collected: 10/04/24 1314    Lab Status: Preliminary result Specimen: Blood from Arm, Left Updated: 10/04/24 2002     Blood Culture Received in Microbiology Lab. Culture in Progress.    Blood culture #2 [[id number]] Collected: 10/04/24 1344    Lab Status: Preliminary result Specimen: Blood from Arm, Right Updated: 10/04/24 2002     Blood Culture Received in Microbiology Lab. Culture in Progress.    HS Troponin I 2hr [[id number]]  (Normal) Collected: 10/04/24 1521    Lab Status: Final result Specimen: Blood from Arm, Left Updated: 10/04/24 1555     hs TnI 2hr 4 ng/L      Delta 2hr hsTnI -3 ng/L     RBC Morphology Reflex Test [[id number]] Collected: 10/04/24 1314    Lab Status: Final result Specimen: Blood from Arm, Left Updated: 10/04/24 1501    HS Troponin 0hr (reflex protocol) [[id number]]  (Normal) Collected: 10/04/24 1314    Lab Status: Final result Specimen: Blood from Arm, Left Updated: 10/04/24 1449     hs TnI 0hr 7 ng/L     CBC and differential [[id number]]  (Abnormal) Collected: 10/04/24 1314    Lab Status: Final result Specimen: Blood from Arm, Left Updated: 10/04/24 1440     WBC 10.69 Thousand/uL      RBC 4.19 Million/uL      Hemoglobin 13.4 g/dL      Hematocrit 40.6 %      MCV 97 fL      MCH 32.0 pg      MCHC 33.0 g/dL      RDW 12.1 %      MPV 9.9 fL      Platelets 124 Thousands/uL     Narrative:      This is an appended report.  These results have been appended to a previously verified report.    Manual Differential(PHLEBS Do Not Order) [[id number]]  (Abnormal) Collected: 10/04/24 1314    Lab Status: Final result Specimen: Blood from Arm, Left Updated: 10/04/24 1440     Segmented % 90 %      Bands  % 7 %      Lymphocytes % 2 %      Monocytes % 1 %      Eosinophils % 0 %      Basophils % 0 %      Absolute Neutrophils 10.37 Thousand/uL      Absolute Lymphocytes 0.21 Thousand/uL      Absolute Monocytes 0.11 Thousand/uL      Absolute Eosinophils 0.00 Thousand/uL      Absolute Basophils 0.00 Thousand/uL      Total Counted --     RBC Morphology Normal     Platelet Estimate Borderline    Urine Microscopic [[id number]]  (Abnormal) Collected: 10/04/24 1328    Lab Status: Final result Specimen: Urine, Clean Catch Updated: 10/04/24 1425     RBC, UA 4-10 /hpf      WBC, UA Innumerable /hpf      Epithelial Cells Occasional /hpf      Bacteria, UA Moderate /hpf     FLU/COVID Rapid Antigen (30 min. TAT) - Preferred screening test in ED [[id number]]  (Normal) Collected: 10/04/24 1314    Lab Status: Final result Specimen: Nares from Nose Updated: 10/04/24 1421     SARS COV Rapid Antigen Negative     Influenza A Rapid Antigen Negative     Influenza B Rapid Antigen Negative    Narrative:      This test has been performed using the [employer] 2 FLU+SARS Antigen test under the Emergency Use Authorization (EUA). This test has been validated by the  and verified by the performing laboratory. The Mala uses lateral flow immunofluorescent sandwich assay to detect SARS-COV, Influenza A and Influenza B Antigen.     The [employer]idel Mala 2 SARS Antigen test does not differentiate between SARS-CoV and SARS-CoV-2.     Negative results are presumptive and may be confirmed with a molecular assay, if necessary, for patient management. Negative results do not rule out SARS-CoV-2 or influenza infection and should not be used as the sole basis for treatment or patient management decisions. A negative test result may occur if the level of antigen in a sample is below the limit of detection of this test.     Positive results are indicative of the presence of viral antigens, but do not rule out bacterial infection or co-infection with other  viruses.     All test results should be used as an adjunct to clinical observations and other information available to the provider.    FOR PEDIATRIC PATIENTS - copy/paste COVID Guidelines URL to browser: [email or web address]    Comprehensive metabolic panel [[id number]]  (Abnormal) Collected: 10/04/24 1314    Lab Status: Final result Specimen: Blood from Arm, Left Updated: 10/04/24 1406     Sodium 135 mmol/L      Potassium 3.7 mmol/L      Chloride 102 mmol/L      CO2 26 mmol/L      ANION GAP 7 mmol/L      BUN 16 mg/dL      Creatinine 0.91 mg/dL      Glucose 104 mg/dL      Calcium 8.9 mg/dL      AST 19 U/L      ALT 11 U/L      Alkaline Phosphatase 69 U/L      Total Protein 7.6 g/dL      Albumin 3.7 g/dL      Total Bilirubin 1.19 mg/dL      eGFR 62 ml/min/1.73sq m     Narrative:      National Kid[employer] Disease [employer] guidelines for Chronic Kidney Disease (CKD):     Stage 1 with normal or high GFR (GFR > 90 mL/min/1.73 square meters)    Stage 2 Mild CKD (GFR = 60-89 mL/min/1.73 square meters)    Stage 3A Moderate CKD (GFR = 45-59 mL/min/1.73 square meters)    Stage 3B Moderate CKD (GFR = 30-44 mL/min/1.73 square meters)    Stage 4 Severe CKD (GFR = 15-29 mL/min/1.73 square meters)    Stage 5 End Stage CKD (GFR <15 mL/min/1.73 square meters)  Note: GFR calculation is accurate only with a steady state creatinine    Lactic acid, plasma (w/reflex if result > 2.0) [[id number]]  (Normal) Collected: 10/04/24 1314    Lab Status: Final result Specimen: Blood from Arm, Left Updated: 10/04/24 1406     LACTIC ACID 1.0 mmol/L     Narrative:      Result may be elevated if tourniquet was used during collection.    Magnesium [[id number]]  (Abnormal) Collected: 10/04/24 1314    Lab Status: Final result Specimen: Blood from Arm, Left Updated: 10/04/24 1406     Magnesium 1.4 mg/dL     UA (URINE) with reflex to Scope [[id number]]  (Abnormal) Collected: 10/04/24 4947    Lab Status: Final  result Specimen: Urine, Clean Catch Updated: 10/04/24 1405     Color, UA Yellow     Clarity, UA Cloudy     Specific Gravity, UA 1.015     pH, UA 7.0     Leukocytes, UA Large     Nitrite, UA Positive     Protein, UA 30 (1+) mg/dl      Glucose, UA Negative mg/dl      Ketones, UA Negative mg/dl      Urobilinogen, UA <2.0 mg/dl      Bilirubin, UA Negative     Occult Blood, UA Moderate    Protime-INR [[id number]]  (Abnormal) Collected: 10/04/24 1314    Lab Status: Final result Specimen: Blood from Arm, Left Updated: 10/04/24 1359     Protime 15.2 seconds      INR 1.15    Narrative:      INR Therapeutic Range    Indication                                             INR Range      Atrial Fibrillation                                               2.0-3.0  Hypercoagulable State                                    2.0.2.3  Left Ventricular Asist Device                            2.0-3.0  Mechanical Heart Valve                                  -    Aortic(with afib, MI, embolism, HF, LA enlargement,    and/or coagulopathy)                                     2.0-3.0 (2.5-3.5)     Mitral                                                             2.5-3.5  Prosthetic/Bioprosthetic Heart Valve               2.0-3.0  Venous thromboembolism (VTE: VT, PE        2.0-3.0    APTT [[id number]]  (Normal) Collected: 10/04/24 1314    Lab Status: Final result Specimen: Blood from Arm, Left Updated: 10/04/24 1359     PTT 25 seconds     Urine culture [[id number]] Collected: 10/04/24 1329    Lab Status: In process Specimen: Urine, Clean Catch Updated: 10/04/24 1333    Fingerstick Glucose (POCT) [[id number]]  (Normal) Collected: 10/04/24 1305    Lab Status: Final result Specimen: Blood Updated: 10/04/24 1306     POC Glucose 112 mg/dl             CT abdomen pelvis with contrast   Final Interpretation by Josh Atwood MD (10/04 1516)      Findings suspicious for right pyelonephritis and cystitis.      CT ABDOMEN PELVIS W CONTRAST       INDICATION: uti.      COMPARISON: None      FINDINGS:      No acute fracture or dislocation.      No joint effusion.      No significant degenerative changes.      No lytic or blastic osseous lesion.      Unremarkable soft tissues.      IMPRESSION:      No acute osseous abnormality.         Computerized Assisted Algorithm (CAA) may have been used to analyze all applicable images.            Workstation performed: [id number]         XR chest 1 view portable   Final Interpretation by Dora Samuel MD (10/04 1504)      No acute cardiopulmonary disease.            Workstation performed: [id number]             ECG 12 Lead Documentation Only    Date/Time: 10/4/2024 8:52 PM    Performed by: April Cabrera DO  Authorized by: April Cabrera DO    ECG reviewed by me, the ED Provider: yes    Patient location:  ED  Previous ECG:     Previous ECG:  Unavailable    Comparison to cardiac monitor: Yes    Interpretation:     Interpretation: non-specific    Rate:     ECG rate assessment: normal    Rhythm:     Rhythm: sinus rhythm    Ectopy:     Ectopy: none    QRS:     QRS axis:  Normal  Conduction:     Conduction: normal    ST segments:     ST segments:  Non-specific  T waves:     T waves: non-specific        ED Medication and Procedure Management   Prior to Admission Medications   Prescriptions Last Dose Informant Patient Reported? Taking?   LORazepam (ATIVAN) 0.5 mg tablet Past Month  Yes Yes   Sig: Take 2.5 mg by mouth if needed for anxiety Only for seizure lasting 3 min   Pyridoxine HCl (vitamin B-6) 25 MG tablet 10/4/2024 at 0800  Yes Yes   Sig: Take 100 mg by mouth daily   apixaban (Eliquis) 2.5 mg 10/3/2024 at 1900  Yes Yes   Sig: Take 2.5 mg by mouth 2 (two) times a day   carvedilol (COREG) 12.5 mg tablet 10/4/2024 at 0800  Yes Yes   Sig: Take 6.25 mg by mouth 2 (two) times a day   cloBAZam 10 MG FILM 10/2/2024 at 2100  Yes Yes   Sig: Take 20 mg by mouth daily at bedtime   levETIRAcetam (KEPPRA) 500 mg tablet  10/4/2024 at 0800  Yes Yes   Sig: Take 750 mg by mouth 2 (two) times a day      Facility-Administered Medications: None     Current Discharge Medication List        CONTINUE these medications which have NOT CHANGED    Details   apixaban (Eliquis) 2.5 mg Take 2.5 mg by mouth 2 (two) times a day      carvedilol (COREG) 12.5 mg tablet Take 6.25 mg by mouth 2 (two) times a day      cloBAZam 10 MG FILM Take 20 mg by mouth daily at bedtime      levETIRAcetam (KEPPRA) 500 mg tablet Take 750 mg by mouth 2 (two) times a day      LORazepam (ATIVAN) 0.5 mg tablet Take 2.5 mg by mouth if needed for anxiety Only for seizure lasting 3 min      Pyridoxine HCl (vitamin B-6) 25 MG tablet Take 100 mg by mouth daily           No discharge procedures on file.  ED SEPSIS DOCUMENTATION   Time reflects when diagnosis was documented in both MDM as applicable and the Disposition within this note       Time User Action Codes Description Comment    10/4/2024  3:30 PM April Cabrera Add [N12] Pyelonephritis                  April Cabrera DO  10/04/24 2053

## 2024-10-04 NOTE — ED NOTES
Pt reported that the lights/aura went away. Continue to monitor.     Felicia Sheikh RN  10/04/24 3701

## 2024-10-04 NOTE — ASSESSMENT & PLAN NOTE
"As evidenced on CT imaging with associated cystitis  Urinalysis noting significant pyuria with moderate bacteria and positive nitrites  Given a dose of IV Levaquin in the ED due to concern over a listed allergy of \"hives\" to PCNs -> low cross-reactivity between PCNs and cephalosporins -> transition antibiotic coverage to IV Rocephin pending urine/blood cultures  Of note, patient did have a localized \"hive-type\" erythema and mild irritation around IV site to fluoroquinolone (Levaquin) administration - as stated above, antibiotics switched now   Continue IV fluids  PRN pain/emesis control  "

## 2024-10-04 NOTE — H&P
"H&P - Hospitalist   Name: Emily Martines 74 y.o. female I MRN: [id number]  Unit/Bed#: [address] [place] [address] Date of Admission: 10/4/2024   Date of Service: 10/4/2024 I Hospital Day: 0     Assessment & Plan  Sepsis (HCC)  Presents with fever coupled with tachycardia/tachypnea and thrombocytopenia, in the setting of right pyelonephritis with cystitis (see below)  Also with a mild WBC count elevation, not high enough to qualify for SIRS criteria  Lactic acid normal  Blood cultures collected in the ED  CXR unremarkable  S/p IV fluid boluses in the ED - c/w maintenance infusion as hemodynamically stable  Goal MAP > 65   Pyelonephritis of right kidney  As evidenced on CT imaging with associated cystitis  Urinalysis noting significant pyuria with moderate bacteria and positive nitrites  Given a dose of IV Levaquin in the ED due to concern over a listed allergy of \"hives\" to PCNs -> low cross-reactivity between PCNs and cephalosporins -> transition antibiotic coverage to IV Rocephin pending urine/blood cultures  Of note, patient did have a localized \"hive-type\" erythema and mild irritation around IV site to fluoroquinolone (Levaquin) administration - as stated above, antibiotics switched now   Continue IV fluids  PRN pain/emesis control  Acute metabolic encephalopathy  Transient alteration of mentation in the setting of sepsis from pyelonephritis/cystitis  Blood sugar stable  Electrolytes reviewed -> hypomagnesemia noted status post repletion  Mentation rapidly improving towards baseline  Serial neurochecks and IV fluid hydration, along with treatment of underlying infection  If mentation recurs or worsens, may consider CT imaging to rule out intracranial etiology (less likely clinical concern at this time)  History of pulmonary embolus (PE)  Continue Eliquis  Primary hypertension  Continue Coreg  Hypomagnesemia  Monitor/replete serum magnesium and potassium as necessary  Seizure disorder (HCC)  Continue Keppra - PRN IV Ativan on " board for breakthrough episodes while hospitalized  Also on Clobazam at home (not available on hospital formulary)  History of traumatic brain injury status post craniotomy as precipitating factor noted  History of traumatic brain injury  Status post craniotomy in 2017  Thrombocytopenia (HCC)  Monitor platelet count - monitor for bleeding  Suspect associated with presenting sepsis      VTE Pharmacologic Prophylaxis: VTE Score: 5 High Risk (Score >/= 5) - Pharmacological DVT Prophylaxis Ordered: Apixaban (Eliquis). Sequential Compression Devices Ordered.    Code Status: Level 3 - DNAR and DNI    Discussion with:  Patient at bedside    Anticipated Length of Stay:  Patient will be admitted on an Inpatient basis with an anticipated length of stay of greater than 2 midnights.   Justification for Hospital Stay: Sepsis secondary to right-sided pyelonephritis requiring intravenous antibiotics/fluids, hemodynamic monitoring, and infectious workup.    Total Time for Visit (including Counseling & Coordination of Care):  75 minutes. More than 50% of total time spent on counseling and coordination of care, on one or more of the following: performing physical exam; counseling and coordination of care; obtaining or reviewing history; documenting in the medical record; reviewing/ordering tests, medications or procedures; communicating with other healthcare professionals and discussing with patient's family/caregivers.      History of Present Illness    Chief Complaint:  Weakness with confusion    Emily Martines is a 74 y.o. female who was brought in by family due to some progressive altered mentation and weakness.  In the ED, she was found to meet sepsis criteria with a high-grade fever coupled with tachycardia and tachypnea, along with evidence of right-sided pyelonephritis with cystitis on CT imaging.  Of note, she does have a remote history of traumatic brain injury status post craniotomy in 2017.  She has been in her usual state  [facility] until presenting symptoms started.  She was started on IV fluids and given a dose of intravenous antibiotic in the ED.    At the time of my encounter, after arrival to the medical floor, she is resting in bed fairly comfortably with some fatigue being her only complaint, along with some mild left flank discomfort.  Upon conversation, she responds to questions appropriately and has a relatively pleasant demeanor.     Review of Systems:  A thorough 12 point review systems was conducted.  Pertinent positives and negatives are mentioned in the history of present illness.      Past Medical & Surgical History    Past Medical History:   Diagnosis Date    Epilepsy (HCC)     post craniotomy    Hypertension     Seizures (HCC)        Past Surgical History:   Procedure Laterality Date    BRAIN SURGERY      CRANIOTOMY           Medications:   Prior to Admission medications    Medication Sig Start Date End Date Taking? Authorizing Provider   apixaban (Eliquis) 2.5 mg Take 2.5 mg by mouth 2 (two) times a day 8/9/23  Yes Historical Provider, MD   carvedilol (COREG) 12.5 mg tablet Take 6.25 mg by mouth 2 (two) times a day 10/10/23  Yes Historical Provider, MD   cloBAZam 10 MG FILM Take 20 mg by mouth daily at bedtime   Yes Historical Provider, MD   levETIRAcetam (KEPPRA) 500 mg tablet Take 750 mg by mouth 2 (two) times a day 11/16/23  Yes Historical Provider, MD   LORazepam (ATIVAN) 0.5 mg tablet Take 2.5 mg by mouth if needed for anxiety Only for seizure lasting 3 min   Yes Historical Provider, MD   Pyridoxine HCl (vitamin B-6) 25 MG tablet Take 100 mg by mouth daily   Yes Historical Provider, MD   diazepam (VALIUM) 5 mg tablet Take 5 mg by mouth 2 (two) times a day  Patient not taking: Reported on 10/4/2024 10/17/23 10/4/24  Historical Provider, MD   LORazepam (ATIVAN) 2 mg/mL Inject 0.5 mL (1 mg total) into a catheter in a vein every 4 (four) hours as needed for seizures  Patient not taking: Reported on 10/4/2024  12/21/23 10/4/24  Shayla Banks DO       Allergies:   Allergies   Allergen Reactions    Doxycycline Hives    Morphine And Codeine Hives    Penicillins Hives    Levaquin [Levofloxacin] Rash     Redness and blister to IV site         Social & Family History    Social History     Substance and Sexual Activity   Alcohol Use Not Currently     Social History     Tobacco Use   Smoking Status Never   Smokeless Tobacco Never     Social History     Substance and Sexual Activity   Drug Use Never       History reviewed. No pertinent family history.      Objective     Vitals:   Blood Pressure: (!) 115/49 (10/04/24 1826)  Pulse: 76 (10/04/24 1826)  Temperature: 99.5 °F (37.5 °C) (10/04/24 1826)  Temp Source: Oral (10/04/24 1630)  Respirations: 18 (10/04/24 1826)  Weight - Scale: 97.5 kg (215 lb) (10/04/24 1315)  SpO2: 94 % (10/04/24 1826)      Physical Exam:    GENERAL Weak/fatigued   HEAD   Normocephalic - atraumatic   EYES Nonicteric   MOUTH   Mucosa moist   NECK   Supple - full range of motion   CARDIAC Intermittent tachycardic   PULMONARY Fairly clear to auscultation    ABDOMEN Abdomen nontender/nondistended - mild right flank tenderness   MUSCULOSKELETAL   Motor strength/range of motion mildly deconditioned   NEUROLOGIC   Alert/oriented at baseline   PSYCHIATRIC   Mood/affect stable         Labs & Recent Cultures:  Results from last 7 days   Lab Units 10/04/24  1314   WBC Thousand/uL 10.69*   HEMOGLOBIN g/dL 13.4   HEMATOCRIT % 40.6   PLATELETS Thousands/uL 124*   BANDS PCT % 7   LYMPHO PCT % 2*   MONO PCT % 1*   EOS PCT % 0     Results from last 7 days   Lab Units 10/04/24  1314   SODIUM mmol/L 135   POTASSIUM mmol/L 3.7   CHLORIDE mmol/L 102   CO2 mmol/L 26   BUN mg/dL 16   CREATININE mg/dL 0.91   ANION GAP mmol/L 7   CALCIUM mg/dL 8.9   ALBUMIN g/dL 3.7   TOTAL BILIRUBIN mg/dL 1.19*   ALK PHOS U/L 69   ALT U/L 11   AST U/L 19   GLUCOSE RANDOM mg/dL 104     Results from last 7 days   Lab Units 10/04/24  1314   INR  1.15      Results from last 7 days   Lab Units 10/04/24  1305   POC GLUCOSE mg/dl 112         Results from last 7 days   Lab Units 10/04/24  1314   LACTIC ACID mmol/L 1.0                 Lines/Drains:  Invasive Devices       Peripheral Intravenous Line  Duration             Peripheral IV 10/04/24 Left Antecubital <1 day                      Imaging:     CT abdomen pelvis with contrast    Result Date: 10/4/2024  Narrative: CT ABDOMEN AND PELVIS WITH IV CONTRAST INDICATION: uti. . COMPARISON: None. TECHNIQUE: CT examination of the abdomen and pelvis was performed. Multiplanar 2D reformatted images were created from the source data. This examination, like all CT scans performed in the [facility], was performed utilizing techniques to minimize radiation dose exposure, including the use of iterative reconstruction and automated exposure control. Radiation dose length product (DLP) for this visit: 783 mGy-cm IV Contrast: 100 mL of iohexol (OMNIPAQUE) Enteric Contrast: Not administered. FINDINGS: ABDOMEN LOWER CHEST: Mild hypoventilatory changes. LIVER/BILIARY TREE: Unremarkable. GALLBLADDER: Post cholecystectomy. SPLEEN: Unremarkable. PANCREAS: Unremarkable. ADRENAL GLANDS: There is a 9 mm in short axis left adrenal nodule. #2/56 In general an incidental adrenal mass that is < 1 cm in short axis need not be pursued. Adrenal recommendation based on institutional consensus and Journal of American College of Radiology 2017;14:2861-4641. KIDNEYS/URETERS: Subtle area of anterior right upper pole renal cortical hypoenhancement suspicious for pyelonephritis. #2/72 and 602/161. No perinephric collection. STOMACH AND BOWEL: No acute bowel findings. Debris-filled uncomplicated periampullary duodenal diverticulum. APPENDIX: No findings to suggest appendicitis. ABDOMINOPELVIC CAVITY: No ascites. No pneumoperitoneum. No lymphadenopathy. VESSELS: Unremarkable for patient's age. PELVIS REPRODUCTIVE ORGANS: Unremarkable for  patient's age. URINARY BLADDER: Circumferential wall thickening mucosal hyperemia and perivesicular fat stranding suspicious for cystitis. ABDOMINAL WALL/INGUINAL REGIONS: Unremarkable. Rectus diastases. BONES: No acute fracture or suspicious osseous lesion.     Impression: Findings suspicious for right pyelonephritis and cystitis. CT ABDOMEN PELVIS W CONTRAST INDICATION: uti. COMPARISON: None FINDINGS: No acute fracture or dislocation. No joint effusion. No significant degenerative changes. No lytic or blastic osseous lesion. Unremarkable soft tissues. IMPRESSION: No acute osseous abnormality. Computerized Assisted Algorithm (CAA) may have been used to analyze all applicable images. Workstation performed: [id number]       XR chest 1 view portable    Result Date: 10/4/2024  Narrative: XR CHEST PORTABLE INDICATION: fever. COMPARISON: None FINDINGS: Vagal nerve stimulator. No acute disease. Mild left base atelectasis. No pneumothorax or pleural effusion. Normal cardiomediastinal silhouette. Bones are unremarkable for age. Mild elevation of the left diaphragm.     Impression: No acute cardiopulmonary disease. Workstation performed: [id number]                 CLAUDIA ALLEN MD   Hospitalist - [facility] Internal Medicine        ** Please Note:  Documentation is constructed using a voice recognition dictation system.  An occasional wrong word/phrase or “sound-a-like” substitution may have been picked up by dictation device due to the inherent limitations of voice recognition software.  Read the chart carefully and recognize, using reasonable context, where substitutions may have occurred.**

## 2024-10-05 PROBLEM — R78.81 GRAM-NEGATIVE BACTEREMIA: Status: ACTIVE | Noted: 2024-10-05

## 2024-10-05 LAB
ANION GAP SERPL CALCULATED.3IONS-SCNC: 4 MMOL/L (ref 4–13)
BASOPHILS # BLD AUTO: 0.03 THOUSANDS/ΜL (ref 0–0.1)
BASOPHILS NFR BLD AUTO: 0 % (ref 0–1)
BUN SERPL-MCNC: 15 MG/DL (ref 5–25)
CALCIUM SERPL-MCNC: 8.1 MG/DL (ref 8.4–10.2)
CHLORIDE SERPL-SCNC: 109 MMOL/L (ref 96–108)
CO2 SERPL-SCNC: 26 MMOL/L (ref 21–32)
CREAT SERPL-MCNC: 0.77 MG/DL (ref 0.6–1.3)
EOSINOPHIL # BLD AUTO: 0.05 THOUSAND/ΜL (ref 0–0.61)
EOSINOPHIL NFR BLD AUTO: 1 % (ref 0–6)
ERYTHROCYTE [DISTWIDTH] IN BLOOD BY AUTOMATED COUNT: 12.2 % (ref 11.6–15.1)
GFR SERPL CREATININE-BSD FRML MDRD: 76 ML/MIN/1.73SQ M
GLUCOSE SERPL-MCNC: 90 MG/DL (ref 65–140)
HCT VFR BLD AUTO: 32.4 % (ref 34.8–46.1)
HGB BLD-MCNC: 11 G/DL (ref 11.5–15.4)
IMM GRANULOCYTES # BLD AUTO: 0.04 THOUSAND/UL (ref 0–0.2)
IMM GRANULOCYTES NFR BLD AUTO: 0 % (ref 0–2)
LYMPHOCYTES # BLD AUTO: 1.32 THOUSANDS/ΜL (ref 0.6–4.47)
LYMPHOCYTES NFR BLD AUTO: 12 % (ref 14–44)
MAGNESIUM SERPL-MCNC: 1.9 MG/DL (ref 1.9–2.7)
MCH RBC QN AUTO: 32.8 PG (ref 26.8–34.3)
MCHC RBC AUTO-ENTMCNC: 34 G/DL (ref 31.4–37.4)
MCV RBC AUTO: 97 FL (ref 82–98)
MONOCYTES # BLD AUTO: 0.85 THOUSAND/ΜL (ref 0.17–1.22)
MONOCYTES NFR BLD AUTO: 8 % (ref 4–12)
NEUTROPHILS # BLD AUTO: 8.37 THOUSANDS/ΜL (ref 1.85–7.62)
NEUTS SEG NFR BLD AUTO: 79 % (ref 43–75)
NRBC BLD AUTO-RTO: 0 /100 WBCS
PLATELET # BLD AUTO: 100 THOUSANDS/UL (ref 149–390)
PMV BLD AUTO: 9.3 FL (ref 8.9–12.7)
POTASSIUM SERPL-SCNC: 3.5 MMOL/L (ref 3.5–5.3)
RBC # BLD AUTO: 3.35 MILLION/UL (ref 3.81–5.12)
SODIUM SERPL-SCNC: 139 MMOL/L (ref 135–147)
WBC # BLD AUTO: 10.66 THOUSAND/UL (ref 4.31–10.16)

## 2024-10-05 PROCEDURE — 85025 COMPLETE CBC W/AUTO DIFF WBC: CPT | Performed by: INTERNAL MEDICINE

## 2024-10-05 PROCEDURE — 80048 BASIC METABOLIC PNL TOTAL CA: CPT | Performed by: INTERNAL MEDICINE

## 2024-10-05 PROCEDURE — 99232 SBSQ HOSP IP/OBS MODERATE 35: CPT | Performed by: INTERNAL MEDICINE

## 2024-10-05 PROCEDURE — 83735 ASSAY OF MAGNESIUM: CPT | Performed by: INTERNAL MEDICINE

## 2024-10-05 RX ORDER — CEFTRIAXONE 2 G/50ML
2000 INJECTION, SOLUTION INTRAVENOUS EVERY 24 HOURS
Status: DISCONTINUED | OUTPATIENT
Start: 2024-10-05 | End: 2024-10-07 | Stop reason: HOSPADM

## 2024-10-05 RX ORDER — CLOBAZAM 10 MG/1
20 TABLET ORAL
Status: DISCONTINUED | OUTPATIENT
Start: 2024-10-05 | End: 2024-10-07 | Stop reason: HOSPADM

## 2024-10-05 RX ADMIN — APIXABAN 2.5 MG: 2.5 TABLET, FILM COATED ORAL at 09:19

## 2024-10-05 RX ADMIN — APIXABAN 2.5 MG: 2.5 TABLET, FILM COATED ORAL at 17:50

## 2024-10-05 RX ADMIN — CARVEDILOL 6.25 MG: 6.25 TABLET, FILM COATED ORAL at 17:50

## 2024-10-05 RX ADMIN — CLOBAZAM 20 MG: 10 TABLET ORAL at 21:05

## 2024-10-05 RX ADMIN — PYRIDOXINE HCL TAB 50 MG 100 MG: 50 TAB at 09:21

## 2024-10-05 RX ADMIN — CEFTRIAXONE 2000 MG: 2 INJECTION, SOLUTION INTRAVENOUS at 14:06

## 2024-10-05 RX ADMIN — CARVEDILOL 6.25 MG: 6.25 TABLET, FILM COATED ORAL at 09:19

## 2024-10-05 RX ADMIN — LEVETIRACETAM 750 MG: 500 TABLET, FILM COATED ORAL at 09:22

## 2024-10-05 RX ADMIN — LEVETIRACETAM 750 MG: 500 TABLET, FILM COATED ORAL at 17:50

## 2024-10-05 NOTE — ASSESSMENT & PLAN NOTE
"As evidenced on CT imaging with associated cystitis  Urinalysis noting significant pyuria with moderate bacteria and positive nitrites  Given a dose of IV Levaquin in the ED due to concern over a listed allergy of \"hives\" to PCNs -> low cross-reactivity between PCNs and cephalosporins -> transitioned antibiotic coverage to IV Rocephin pending urine/blood cultures  Of note, patient did have a localized \"hive-type\" erythema and mild irritation around IV site to fluoroquinolone (Levaquin) administration - as stated above, antibiotics switched now   PRN pain/emesis control  "

## 2024-10-05 NOTE — ASSESSMENT & PLAN NOTE
Transient alteration of mentation in the setting of sepsis from pyelonephritis/cystitis  Blood sugar stable  Electrolytes reviewed -> hypomagnesemia noted status post repletion  Mentation rapidly improving towards baseline  Serial neurochecks along with treatment of underlying infection  If altered mentation recurs or worsens, may consider CT imaging to rule out intracranial etiology (less likely clinical concern at this time)  Currently at baseline

## 2024-10-05 NOTE — ASSESSMENT & PLAN NOTE
1 of 2 bottles growing gram-negative rods (E. coli in early detection) - likely associated with pyelonephritis/cystitis  Continue IV Rocephin as above awaiting final sensitivities

## 2024-10-05 NOTE — PLAN OF CARE
Problem: Potential for Falls  Goal: Patient will remain free of falls  Description: INTERVENTIONS:  - Educate patient/family on patient safety including physical limitations  - Instruct patient to call for assistance with activity   - Consult OT/PT to assist with strengthening/mobility   - Keep Call bell within reach  - Keep bed low and locked with side rails adjusted as appropriate  - Keep care items and personal belongings within reach  - Initiate and maintain comfort rounds  - Make Fall Risk Sign visible to staff  - Offer Toileting every 2 Hours, in advance of need  - Initiate/Maintain bed alarm  - Obtain necessary fall risk management equipment: walker, commode  - Apply yellow socks and bracelet for high fall risk patients  - Consider moving patient to room near nurses station  Outcome: Progressing     Problem: PAIN - ADULT  Goal: Verbalizes/displays adequate comfort level or baseline comfort level  Description: Interventions:  - Encourage patient to monitor pain and request assistance  - Assess pain using appropriate pain scale  - Administer analgesics based on type and severity of pain and evaluate response  - Implement non-pharmacological measures as appropriate and evaluate response  - Consider cultural and social influences on pain and pain management  - Notify physician/advanced practitioner if interventions unsuccessful or patient reports new pain  Outcome: Progressing     Problem: INFECTION - ADULT  Goal: Absence or prevention of progression during hospitalization  Description: INTERVENTIONS:  - Assess and monitor for signs and symptoms of infection  - Monitor lab/diagnostic results  - Monitor all insertion sites, i.e. indwelling lines, tubes, and drains  - Monitor endotracheal if appropriate and nasal secretions for changes in amount and color  - Killen appropriate cooling/warming therapies per order  - Administer medications as ordered  - Instruct and encourage patient and family to use good hand  hygiene technique  - Identify and instruct in appropriate isolation precautions for identified infection/condition  Outcome: Progressing     Problem: SAFETY ADULT  Goal: Patient will remain free of falls  Description: INTERVENTIONS:  - Educate patient/family on patient safety including physical limitations  - Instruct patient to call for assistance with activity   - Consult OT/PT to assist with strengthening/mobility   - Keep Call bell within reach  - Keep bed low and locked with side rails adjusted as appropriate  - Keep care items and personal belongings within reach  - Initiate and maintain comfort rounds  - Make Fall Risk Sign visible to staff  - Offer Toileting every 2 Hours, in advance of need  - Initiate/Maintain bed alarm  - Obtain necessary fall risk management equipment: walker, commode  - Apply yellow socks and bracelet for high fall risk patients  - Consider moving patient to room near nurses station  Outcome: Progressing  Goal: Maintain or return to baseline ADL function  Description: INTERVENTIONS:  -  Assess patient's ability to carry out ADLs; assess patient's baseline for ADL function and identify physical deficits which impact ability to perform ADLs (bathing, care of mouth/teeth, toileting, grooming, dressing, etc.)  - Assess/evaluate cause of self-care deficits   - Assess range of motion  - Assess patient's mobility; develop plan if impaired  - Assess patient's need for assistive devices and provide as appropriate  - Encourage maximum independence but intervene and supervise when necessary  - Involve family in performance of ADLs  - Assess for home care needs following discharge   - Consider OT consult to assist with ADL evaluation and planning for discharge  - Provide patient education as appropriate  Outcome: Progressing  Goal: Maintains/Returns to pre admission functional level  Description: INTERVENTIONS:  - Perform AM-PAC 6 Click Basic Mobility/ Daily Activity assessment daily.  - Set and  communicate daily mobility goal to care team and patient/family/caregiver.   - Collaborate with rehabilitation services on mobility goals if consulted  - Perform Range of Motion 2 times a day.  - Reposition patient every 2 hours.  - Dangle patient 2 times a day  - Stand patient 2 times a day  - Ambulate patient 2 times a day  - Out of bed to chair 2 times a day   - Out of bed for meals 2 times a day  - Out of bed for toileting  - Record patient progress and toleration of activity level   Outcome: Progressing     Problem: DISCHARGE PLANNING  Goal: Discharge to home or other facility with appropriate resources  Description: INTERVENTIONS:  - Identify barriers to discharge w/patient and caregiver  - Arrange for needed discharge resources and transportation as appropriate  - Identify discharge learning needs (meds, wound care, etc.)  - Arrange for interpretive services to assist at discharge as needed  - Refer to Case Management Department for coordinating discharge planning if the patient needs post-hospital services based on physician/advanced practitioner order or complex needs related to functional status, cognitive ability, or social support system  Outcome: Progressing     Problem: Knowledge Deficit  Goal: Patient/family/caregiver demonstrates understanding of disease process, treatment plan, medications, and discharge instructions  Description: Complete learning assessment and assess knowledge base.  Interventions:  - Provide teaching at level of understanding  - Provide teaching via preferred learning methods  Outcome: Progressing     Problem: NEUROSENSORY - ADULT  Goal: Remains free of injury related to seizures activity  Description: INTERVENTIONS  - Maintain airway, patient safety  and administer oxygen as ordered  - Monitor patient for seizure activity, document and report duration and description of seizure to physician/advanced practitioner  - If seizure occurs,  ensure patient safety during seizure  -  Reorient patient post seizure  - Seizure pads on all 4 side rails  - Instruct patient/family to notify RN of any seizure activity including if an aura is experienced  - Instruct patient/family to call for assistance with activity based on nursing assessment  - Administer anti-seizure medications if ordered    Outcome: Progressing  Goal: Achieves maximal functionality and self care  Description: INTERVENTIONS  - Monitor swallowing and airway patency with patient fatigue and changes in neurological status  - Encourage and assist patient to increase activity and self care.   - Encourage visually impaired, hearing impaired and aphasic patients to use assistive/communication devices  Outcome: Progressing     Problem: RESPIRATORY - ADULT  Goal: Achieves optimal ventilation and oxygenation  Description: INTERVENTIONS:  - Assess for changes in respiratory status  - Assess for changes in mentation and behavior  - Position to facilitate oxygenation and minimize respiratory effort  - Oxygen administered by appropriate delivery if ordered  - Initiate smoking cessation education as indicated  - Encourage broncho-pulmonary hygiene including cough, deep breathe, Incentive Spirometry  - Assess the need for suctioning and aspirate as needed  - Assess and instruct to report SOB or any respiratory difficulty  - Respiratory Therapy support as indicated  Outcome: Progressing     Problem: GENITOURINARY - ADULT  Goal: Maintains or returns to baseline urinary function  Description: INTERVENTIONS:  - Assess urinary function  - Encourage oral fluids to ensure adequate hydration if ordered  - Administer IV fluids as ordered to ensure adequate hydration  - Administer ordered medications as needed  - Offer frequent toileting  - Follow urinary retention protocol if ordered  Outcome: Progressing

## 2024-10-05 NOTE — PROGRESS NOTES
"Progress Note - Hospitalist   Name: Emily Martines 74 y.o. female I MRN: [id number]  Unit/Bed#: 2 [address] Date of Admission: 10/4/2024   Date of Service: 10/5/2024 I Hospital Day: 1    Assessment & Plan  Sepsis (HCC)  Presented with fever coupled with tachycardia/tachypnea and thrombocytopenia, in the setting of right pyelonephritis with cystitis (see below)  Also with a mild WBC count elevation, not high enough to qualify for SIRS criteria  Lactic acid normal  1 of 2 bottles growing gram-negative rods (E. coli in early detection)  CXR unremarkable  S/p IV fluid boluses in the ED - remains hemodynamically stable now off IV fluids  Goal MAP > 65   Pyelonephritis of right kidney  As evidenced on CT imaging with associated cystitis  Urinalysis noting significant pyuria with moderate bacteria and positive nitrites  Given a dose of IV Levaquin in the ED due to concern over a listed allergy of \"hives\" to PCNs -> low cross-reactivity between PCNs and cephalosporins -> transitioned antibiotic coverage to IV Rocephin pending urine/blood cultures  Of note, patient did have a localized \"hive-type\" erythema and mild irritation around IV site to fluoroquinolone (Levaquin) administration - as stated above, antibiotics switched now   PRN pain/emesis control  Gram-negative bacteremia  1 of 2 bottles growing gram-negative rods (E. coli in early detection) - likely associated with pyelonephritis/cystitis  Continue IV Rocephin as above awaiting final sensitivities  Acute metabolic encephalopathy  Transient alteration of mentation in the setting of sepsis from pyelonephritis/cystitis  Blood sugar stable  Electrolytes reviewed -> hypomagnesemia noted status post repletion  Mentation rapidly improving towards baseline  Serial neurochecks along with treatment of underlying infection  If altered mentation recurs or worsens, may consider CT imaging to rule out intracranial etiology (less likely clinical concern at this time)  Currently " at baseline  History of pulmonary embolus (PE)  Continue Eliquis  Primary hypertension  Continue Coreg  Hypomagnesemia  Monitor/replete serum magnesium and potassium as necessary  Seizure disorder (HCC)  Continue Keppra - PRN IV Ativan on board for breakthrough episodes while hospitalized  Also on Clobazam at home (not available on hospital formulary)  History of traumatic brain injury status post craniotomy as precipitating factor noted  History of traumatic brain injury  Status post craniotomy in 2017  Thrombocytopenia (HCC)  Monitor platelet count - monitor for bleeding  Suspect associated with presenting sepsis      VTE Pharmacologic Prophylaxis: VTE Score: 5 High Risk (Score >/= 5) - Pharmacological DVT Prophylaxis Ordered: Apixaban (Eliquis). Sequential Compression Devices Ordered.    AM-PAC Basic Mobility:  Basic Mobility Inpatient Raw Score: 17  JH-HLM Goal: 5: Stand one or more mins  JH-HLM Achieved: 2: Bed activities/Dependent transfer  JH-HLM Goal NOT achieved. Continue with multidisciplinary rounding and encourage appropriate mobility to improve upon JH-HLM goals.    Patient Centered Rounds:  I have performed bedside rounds and discussed plan of care with nursing today.  Discussions with Specialists or Other Care Team Provider:  see above assessments if applicable    Education and Discussions with Family / Patient:  Patient at bedside, who will self update contacts, as necessary    Time Spent for Care:  35 minutes. More than 50% of total time spent on counseling and coordination of care, on one or more of the following: performing physical exam; counseling and coordination of care, obtaining or reviewing history, documenting in the medical record, reviewing/ordering tests/medications/procedures, and communicating with other healthcare professionals.    Current Length of Stay: 1 day(s)  Current Patient Status: Inpatient   Certification Statement:  Patient will continue to require additional hospital stay  due to assessments as noted above.    Code Status: Level 3 - DNAR and DNI      Subjective     Encountered earlier today.  Sitting upright in chair with legs reclined.  States she feels a bit less fatigued.      Objective     Vitals:   Temp (24hrs), Av.9 °F (37.2 °C), Min:97.2 °F (36.2 °C), Max:103.1 °F (39.5 °C)    Temp:  [97.2 °F (36.2 °C)-103.1 °F (39.5 °C)] 97.2 °F (36.2 °C)  HR:  [66-95] 66  Resp:  [18-28] 18  BP: ()/(43-82) 113/58  SpO2:  [90 %-96 %] 93 %  Body mass index is 35.2 kg/m².     Input and Output Summary (last 24 hours):       Intake/Output Summary (Last 24 hours) at 10/5/2024 1047  Last data filed at 10/4/2024 1621  Gross per 24 hour   Intake 600 ml   Output --   Net 600 ml       Physical Exam:     GENERAL No immediate distress at rest - mildly weak/fatigued   HEAD   Normocephalic - atraumatic   EYES Nonicteric   MOUTH   Mucosa moist   NECK   Supple - full range of motion   CARDIAC Rate controlled   PULMONARY   Clear breath sounds bilaterally - nonlabored respirations   ABDOMEN Nontender/nondistended -    MUSCULOSKELETAL   Motor strength/range of motion deconditioned   NEUROLOGIC   Alert/oriented at baseline   PSYCHIATRIC   Mood/affect stable         Labs & Recent Cultures:  Results from last 7 days   Lab Units 10/05/24  0602 10/04/24  1314   WBC Thousand/uL 10.66* 10.69*   HEMOGLOBIN g/dL 11.0* 13.4   HEMATOCRIT % 32.4* 40.6   PLATELETS Thousands/uL 100* 124*   BANDS PCT %  --  7   SEGS PCT % 79*  --    LYMPHO PCT % 12* 2*   MONO PCT % 8 1*   EOS PCT % 1 0     Results from last 7 days   Lab Units 10/05/24  0602 10/04/24  1314   POTASSIUM mmol/L 3.5 3.7   CHLORIDE mmol/L 109* 102   CO2 mmol/L 26 26   BUN mg/dL 15 16   CREATININE mg/dL 0.77 0.91   CALCIUM mg/dL 8.1* 8.9   ALK PHOS U/L  --  69   ALT U/L  --  11   AST U/L  --  19     Results from last 7 days   Lab Units 10/04/24  1314   INR  1.15     Results from last 7 days   Lab Units 10/04/24  1305   POC GLUCOSE mg/dl 112         Results  from last 7 days   Lab Units 10/04/24  1314   LACTIC ACID mmol/L 1.0         Results from last 7 days   Lab Units 10/04/24  1344 10/04/24  1314   BLOOD CULTURE   --  Received in Microbiology Lab. Culture in Progress.   GRAM STAIN RESULT  Gram negative rods*  --          Lines/Drains/Telemetry:  Invasive Devices       Peripheral Intravenous Line  Duration             Peripheral IV 10/04/24 Left Antecubital <1 day                    Last 24 Hours Medication List:   Current Facility-Administered Medications   Medication Dose Route Frequency Provider Last Rate    acetaminophen  650 mg Oral Q6H PRN Lila Taylor MD      apixaban  2.5 mg Oral BID Lila Taylor MD      carvedilol  6.25 mg Oral BID Lila Taylor MD      cefTRIAXone  2,000 mg Intravenous Q24H Lila Taylor MD      levETIRAcetam  750 mg Oral BID Lila Taylor MD      LORazepam  2 mg Intravenous Q4H PRN Lila Taylor MD      ondansetron  4 mg Intravenous Q4H PRN Lila Taylor MD      vitamin B-6  100 mg Oral Daily MD LLIA Gan MD   Hospitalist - [facility] Medicine        ** Please Note:  Documentation is constructed using a voice recognition dictation system.  An occasional wrong word/phrase or “sound-a-like” substitution may have been picked up by dictation device due to the inherent limitations of voice recognition software.  Read the chart carefully and recognize, using reasonable context, where substitutions may have occurred.**

## 2024-10-05 NOTE — UTILIZATION REVIEW
Initial Clinical Review    Admission: Date/Time/Statement:   Admission Orders (From admission, onward)       Ordered        10/04/24 1530  INPATIENT ADMISSION  Once                          Orders Placed This Encounter   Procedures    INPATIENT ADMISSION     Standing Status:   Standing     Number of Occurrences:   1     Order Specific Question:   Level of Care     Answer:   Med Surg [16]     Order Specific Question:   Estimated length of stay     Answer:   More than 2 Midnights     Order Specific Question:   Certification     Answer:   I certify that inpatient services are medically necessary for this patient for a duration of greater than two midnights. See H&P and MD Progress Notes for additional information about the patient's course of treatment.     ED Arrival Information       Expected   -    Arrival   10/4/2024 12:23    Acuity   Emergent              Means of arrival   Wheelchair    Escorted by   Family Member    Service   Hospitalist    Admission type   Emergency              Arrival complaint   weakness/periods of confusion             Chief Complaint   Patient presents with    Fever     Brought in by family for confusion and weakness since early this am. Noted to have high temp in triage. Also c/o burning on urination and strong smell to urine       Initial Presentation:   74 yof to ER from home with confusion generalized weakness dysuria and frequency for the past couple of days. Also reports right-sided flank pain radiating to her right lower abdomen sharp continuous. Hx  epilepsy post craniotomy, HTN. Presents febrile & tachypneic with dysuria, flank pain, frequency and urgency. Admission CT a/p: Findings suspicious for right pyelonephritis and cystitis. CXR neg. Labs: WBC 10.69, , Mg 1.4, tbili  1.19, PT 15.2, u/a:cloudy+ blood, prot, nitrite, leuk, WBC, bacteria.  Admitted to inpatient status for sepsis 2nd pyelonephritis. Started on IVABT cultures pending.       Anticipated Length of  Stay/Certification Statement:   Patient will be admitted on an Inpatient basis with an anticipated length of stay of greater than 2 midnights.     Justification for Hospital Stay: Sepsis secondary to right-sided pyelonephritis requiring intravenous antibiotics/fluids, hemodynamic monitoring, and infectious workup.     Date: 10/5/24   Day 2:   Tmax 103.1. IVABT in progress for sepsis 2nd pyelonephritis. 1 of 2 bottles growing gram-negative rods (E. coli in early detection) - likely associated with pyelonephritis/cystitis. Continue IV Rocephin as above awaiting final sensitivities. Continue to monitor VS/temps, follow labs/cultures.    Date: 10/6/24  Day 3: Has surpassed a 2nd midnight with active treatments and services.  Dx: sepsis 2nd pyelonephritis, bacteremia. WBC trending downward. Remains on IVABT at this time. Continue to monitor VS/temps, follow labs, final culture/sensitivities.          ED Treatment-Medication Administration from 10/04/2024 1222 to 10/04/2024 1821         Date/Time Order Dose Route Action     10/04/2024 1333 acetaminophen (Ofirmev) injection 1,000 mg 1,000 mg Intravenous New Bag     10/04/2024 1324 sodium chloride 0.9 % bolus 500 mL 500 mL Intravenous New Bag     10/04/2024 1417 levofloxacin (LEVAQUIN) IVPB (premix in dextrose) 500 mg 100 mL 500 mg Intravenous New Bag     10/04/2024 1504 iohexol (OMNIPAQUE) 350 MG/ML injection (MULTI-DOSE) 100 mL 100 mL Intravenous Given     10/04/2024 1530 sodium chloride 0.9 % bolus 1,000 mL 1,000 mL Intravenous New Bag     10/04/2024 1628 diphenhydrAMINE (BENADRYL) injection 25 mg 25 mg Intravenous Given     10/04/2024 1628 LORazepam (ATIVAN) injection 0.5 mg 0.5 mg Intravenous Given     10/04/2024 1638 levETIRAcetam (KEPPRA) injection 750 mg 750 mg Intravenous Given            Scheduled Medications:  Medications 09/27 09/28 09/29 09/30 10/01 10/02 10/03 10/04 10/05 10/06   acetaminophen (Ofirmev) injection 1,000 mg  Dose: 1,000 mg  Freq: Once Route:  IV  Last Dose: Stopped (10/04/24 1424)  Start: 10/04/24 1315 End: 10/04/24 1424   Admin Instructions:              1333     1424          acetaminophen (TYLENOL) tablet 650 mg  Dose: 650 mg  Freq: Once Route: PO  Indications of Use: FEVER  Start: 10/04/24 1245 End: 10/04/24 1259           1259-D/C'd  (1330)          apixaban (ELIQUIS) tablet 2.5 mg  Dose: 2.5 mg  Freq: 2 times daily Route: PO  Start: 10/04/24 1900   Admin Instructions:      Order specific questions:              2005 0919 1750      0840     1800        carvedilol (COREG) tablet 6.25 mg  Dose: 6.25 mg  Freq: 2 times daily Route: PO  Start: 10/04/24 1900   Admin Instructions:      Order specific questions:              2005 0919 1750 0840     1800        cefTRIAXone (ROCEPHIN) IVPB (premix in dextrose) 1,000 mg 50 mL  Dose: 1,000 mg  Freq: Every 24 hours Route: IV  Start: 10/05/24 1400 End: 10/05/24 1046   Admin Instructions:      Order specific questions:               1046-D/C'd       cefTRIAXone (ROCEPHIN) IVPB (premix in dextrose) 2,000 mg 50 mL  Dose: 2,000 mg  Freq: Every 24 hours Route: IV  Last Dose: Stopped (10/05/24 1744)  Start: 10/05/24 1400   Admin Instructions:      Order specific questions:               1406     1408 [C]     1609 [C]     1747      1600        cloBAZam (ONFI) tablet 20 mg  Dose: 20 mg  Freq: Daily at bedtime Route: PO  Start: 10/05/24 2045   Admin Instructions:               2105 2000        diphenhydrAMINE (BENADRYL) injection 25 mg  Dose: 25 mg  Freq: Once Route: IV  Start: 10/04/24 1630 End: 10/04/24 1628   Admin Instructions:              1628          levETIRAcetam (KEPPRA) injection 750 mg  Dose: 750 mg  Freq: Once Route: IV  Start: 10/04/24 1630 End: 10/04/24 1638   Admin Instructions:              1638          levETIRAcetam (KEPPRA) tablet 750 mg  Dose: 750 mg  Freq: 2 times daily Route: PO  Start: 10/05/24 0900   Admin Instructions:               0922     1750      0840     1800         levETIRAcetam (KEPPRA) tablet 750 mg  Dose: 750 mg  Freq: 2 times daily Route: PO  Start: 10/04/24 1900 End: 10/04/24 1851   Admin Instructions:              1851-D/C'd        levofloxacin (LEVAQUIN) IVPB (premix in dextrose) 500 mg 100 mL  Dose: 500 mg  Freq: Once Route: IV  Last Dose: Stopped (10/04/24 1621)  Start: 10/04/24 1415 End: 10/04/24 1621   Admin Instructions:      Order specific questions:              1417     1621          LORazepam (ATIVAN) injection 0.5 mg  Dose: 0.5 mg  Freq: Once Route: IV  Start: 10/04/24 1630 End: 10/04/24 1628   Admin Instructions:              1628          magnesium sulfate 2 g/50 mL IVPB (premix) 2 g  Dose: 2 g  Freq: Once Route: IV  Last Dose: Stopped (10/06/24 1045)  Start: 10/06/24 1015 End: 10/06/24 1045   Admin Instructions:                1044     1045 [C]        magnesium sulfate 2 g/50 mL IVPB (premix) 2 g  Dose: 2 g  Freq: Once Route: IV  Last Dose: 2 g (10/04/24 2022)  Start: 10/04/24 1900 End: 10/04/24 2222   Admin Instructions:              2022          NON FORMULARY  Dose: 20 mg  Freq: Daily at bedtime Route: PO  Start: 10/05/24 2000 End: 10/05/24 2014   Admin Instructions:      Order specific questions:               2014-D/C'd  2038 [C]         potassium chloride (Klor-Con M20) CR tablet 40 mEq  Dose: 40 mEq  Freq: Once Route: PO  Start: 10/06/24 1015 End: 10/06/24 1043   Admin Instructions:                1043        pyridoxine (VITAMIN B6) tablet 100 mg  Dose: 100 mg  Freq: Daily Route: PO  Start: 10/05/24 0900            0921      0840        sodium chloride 0.9 % bolus 1,000 mL  Dose: 1,000 mL  Freq: Once Route: IV  Last Dose: 1,000 mL (10/04/24 1530)  Start: 10/04/24 1530 End: 10/04/24 1630           1530          sodium chloride 0.9 % bolus 500 mL  Dose: 500 mL  Freq: Once Route: IV  Last Dose: Stopped (10/04/24 1424)  Start: 10/04/24 1315 End: 10/04/24 1424           1324     1424                      Continuous Meds Sorted by Name  for Bobbi,  Emily as of 09/27/24 through 10/6/24  Legend:       Medications 09/27 09/28 09/29 09/30 10/01 10/02 10/03 10/04 10/05 10/06               PRN Meds Sorted by Name  for Emily Martines as of 09/27/24 through 10/6/24  Legend:       Medications 09/27 09/28 09/29 09/30 10/01 10/02 10/03 10/04 10/05 10/06   acetaminophen (TYLENOL) tablet 650 mg  Dose: 650 mg  Freq: Every 6 hours PRN Route: PO  PRN Reasons: mild pain,headaches,fever  Start: 10/04/24 1845                iohexol (OMNIPAQUE) 350 MG/ML injection (MULTI-DOSE) 100 mL  Dose: 100 mL  Freq: Once in imaging Route: IV  PRN Reason: contrast  Start: 10/04/24 1504 End: 10/04/24 1504           1504          LORazepam (ATIVAN) injection 2 mg  Dose: 2 mg  Freq: Every 4 hours PRN Route: IV  PRN Reasons: seizures,agitation  Start: 10/04/24 1850   Admin Instructions:                   ondansetron (ZOFRAN) injection 4 mg  Dose: 4 mg  Freq: Every 4 hours PRN Route: IV  PRN Reasons: nausea,vomiting  Start: 10/04/24 1845   Admin Instructions:                                 ED Triage Vitals [10/04/24 1231]   Temperature Pulse Respirations Blood Pressure SpO2 Pain Score   (!) 103.1 °F (39.5 °C) 95 (!) 24 119/58 93 % No Pain     Weight (last 2 days)       Date/Time Weight    10/04/24 2024 105 (231.48)    10/04/24 1315 97.5 (215)            Vital Signs (last 3 days)       Date/Time Temp Pulse Resp BP MAP (mmHg) SpO2 O2 Device Patient Position - Orthostatic VS Pain    10/06/24 1330 -- -- -- -- -- -- -- -- No Pain    10/06/24 08:40:28 -- 68 -- 119/76 90 93 % -- -- --    10/06/24 0840 -- 68 -- 119/76 -- -- -- -- --    10/06/24 07:31:25 97.3 °F (36.3 °C) 65 18 137/78 98 95 % None (Room air) Lying No Pain    10/05/24 23:27:34 97.8 °F (36.6 °C) 72 19 120/62 81 93 % -- -- --    10/05/24 19:59:16 97.7 °F (36.5 °C) 71 16 137/75 96 97 % -- -- No Pain    10/05/24 15:35:47 97.6 °F (36.4 °C) 67 -- 142/78 99 97 % -- -- --    10/05/24 09:22:24 -- 66 -- 113/58 76 93 % None (Room air) -- No Pain     10/05/24 0919 -- 66 -- 113/58 -- -- -- -- --    10/05/24 07:47:07 97.2 °F (36.2 °C) -- 18 119/68 85 -- -- -- --    10/05/24 02:33:29 97.9 °F (36.6 °C) 70 20 107/52 70 92 % -- -- --    10/04/24 22:37:12 98 °F (36.7 °C) 71 24 109/52 71 91 % -- -- --    10/04/24 2024 -- -- -- -- -- -- -- -- No Pain    10/04/24 20:01:34 98.3 °F (36.8 °C) 77 18 113/50 71 92 % None (Room air) -- --    10/04/24 18:26:54 99.5 °F (37.5 °C) 76 18 115/49 71 94 % -- -- --    10/04/24 1745 98.2 °F (36.8 °C) 76 20 137/82 102 96 % -- -- --    10/04/24 1715 -- 72 24 126/82 98 95 % -- -- --    10/04/24 1645 -- 76 20 114/57 81 92 % -- -- --    10/04/24 1630 98.2 °F (36.8 °C) 80 20 130/63 90 92 % -- -- --    10/04/24 1600 -- 80 26 93/54 69 92 % -- -- --    10/04/24 1515 -- 84 18 104/51 73 90 % None (Room air) -- --    10/04/24 1500 99 °F (37.2 °C) 84 20 94/46 67 91 % None (Room air) Lying --    10/04/24 1420 99.6 °F (37.6 °C) 84 22 103/50 72 91 % -- -- --    10/04/24 1415 -- 84 22 113/43 76 91 % -- -- --    10/04/24 1400 -- 84 28 128/61 88 90 % -- -- --    10/04/24 1345 -- 86 20 134/63 90 92 % -- -- --    10/04/24 1315 -- 88 22 122/56 80 92 % None (Room air) -- --    10/04/24 1231 103.1 °F (39.5 °C) 95 24 119/58 -- 93 % None (Room air) Sitting No Pain              Pertinent Labs/Diagnostic Test Results:   Radiology:  CT abdomen pelvis with contrast   Final Interpretation by Josh Atwood MD (10/04 0106)      Findings suspicious for right pyelonephritis and cystitis.      CT ABDOMEN PELVIS W CONTRAST      INDICATION: uti.      COMPARISON: None      FINDINGS:      No acute fracture or dislocation.      No joint effusion.      No significant degenerative changes.      No lytic or blastic osseous lesion.      Unremarkable soft tissues.      IMPRESSION:      No acute osseous abnormality.         Computerized Assisted Algorithm (CAA) may have been used to analyze all applicable images.            Workstation performed: [id number]         XR  "chest 1 view portable   Final Interpretation by Dora Samuel MD (10/04 1504)      No acute cardiopulmonary disease.            Workstation performed: [id number]           Cardiology:  No orders to display     GI:  No orders to display           Results from last 7 days   Lab Units 10/06/24  0509 10/05/24  0602 10/04/24  1314   WBC Thousand/uL 6.42 10.66* 10.69*   HEMOGLOBIN g/dL 11.4* 11.0* 13.4   HEMATOCRIT % 33.8* 32.4* 40.6   PLATELETS Thousands/uL 111* 100* 124*   TOTAL NEUT ABS Thousands/µL 4.45 8.37*  --    BANDS PCT %  --   --  7         Results from last 7 days   Lab Units 10/06/24  0509 10/05/24  0602 10/04/24  1314   SODIUM mmol/L 139 139 135   POTASSIUM mmol/L 3.7 3.5 3.7   CHLORIDE mmol/L 108 109* 102   CO2 mmol/L 26 26 26   ANION GAP mmol/L 5 4 7   BUN mg/dL 17 15 16   CREATININE mg/dL 0.78 0.77 0.91   EGFR ml/min/1.73sq m 75 76 62   CALCIUM mg/dL 8.3* 8.1* 8.9   MAGNESIUM mg/dL 1.7* 1.9 1.4*     Results from last 7 days   Lab Units 10/04/24  1314   AST U/L 19   ALT U/L 11   ALK PHOS U/L 69   TOTAL PROTEIN g/dL 7.6   ALBUMIN g/dL 3.7   TOTAL BILIRUBIN mg/dL 1.19*     Results from last 7 days   Lab Units 10/04/24  1305   POC GLUCOSE mg/dl 112     Results from last 7 days   Lab Units 10/06/24  0509 10/05/24  0602 10/04/24  1314   GLUCOSE RANDOM mg/dL 86 90 104             No results found for: \"BETA-HYDROXYBUTYRATE\"                   Results from last 7 days   Lab Units 10/04/24  1521 10/04/24  1314   HS TNI 0HR ng/L  --  7   HS TNI 2HR ng/L 4  --    HSTNI D2 ng/L -3  --          Results from last 7 days   Lab Units 10/04/24  1314   PROTIME seconds 15.2*   INR  1.15   PTT seconds 25             Results from last 7 days   Lab Units 10/04/24  1314   LACTIC ACID mmol/L 1.0                                                 Results from last 7 days   Lab Units 10/04/24  1328   CLARITY UA  Cloudy   COLOR UA  Yellow   SPEC GRAV UA  1.015   PH UA  7.0   GLUCOSE UA mg/dl Negative   KETONES UA mg/dl Negative "   BLOOD UA  Moderate*   PROTEIN UA mg/dl 30 (1+)*   NITRITE UA  Positive*   BILIRUBIN UA  Negative   UROBILINOGEN UA (BE) mg/dl <2.0   LEUKOCYTES UA  Large*   WBC UA /hpf Innumerable*   RBC UA /hpf 4-10*   BACTERIA UA /hpf Moderate*   EPITHELIAL CELLS WET PREP /hpf Occasional                                 Results from last 7 days   Lab Units 10/04/24  1344 10/04/24  1329 10/04/24  1314   BLOOD CULTURE  Escherichia coli*  --  No Growth at 24 hrs.   GRAM STAIN RESULT  Gram negative rods*  --   --    URINE CULTURE   --  >100,000 cfu/ml Escherichia coli*  60,000-69,000 cfu/ml Klebsiella pneumoniae*  --                    Past Medical History:   Diagnosis Date    Epilepsy (HCC)     post craniotomy    Hypertension     Seizures (HCC)      Present on Admission:   History of pulmonary embolus (PE)   Primary hypertension      Admitting Diagnosis: Pyelonephritis [N12]  Fever [R50.9]  Age/Sex: 74 y.o. female    Network Utilization Review Department  ATTENTION: Please call with any questions or concerns to [phone] and carefully listen to the prompts so that you are directed to the right person. All voicemails are confidential.   For Discharge needs, contact Care Management DC Support Team at [phone] opt. [phone]  Send all requests for admission clinical reviews, approved or denied determinations and any other requests to dedicated fax number below belonging to the campus where the patient is receiving treatment. List of dedicated fax numbers for the Facilities:  FACILITY NAME UR FAX NUMBER   ADMISSION DENIALS (Administrative/Medical Necessity) [phone]   DISCHARGE SUPPORT TEAM (NETWORK) [phone]   PARENT CHILD HEALTH (Maternity/NICU/Pediatrics) [phone]   [facility] [phone]   [facility] [phone]   [facility] [phone]   [facility] [phone]   [facility]  [phone]   [facility] [phone]   [facility] [phone]   [facility] [phone]   [facility] [phone]   [facility] [phone]   [facility] [phone]   [facility] [phone]

## 2024-10-05 NOTE — PLAN OF CARE
Problem: Potential for Falls  Goal: Patient will remain free of falls  Description: INTERVENTIONS:  - Educate patient/family on patient safety including physical limitations  - Instruct patient to call for assistance with activity   - Consult OT/PT to assist with strengthening/mobility   - Keep Call bell within reach  - Keep bed low and locked with side rails adjusted as appropriate  - Keep care items and personal belongings within reach  - Initiate and maintain comfort rounds  - Make Fall Risk Sign visible to staff  - Offer Toileting every 2 Hours, in advance of need  - Initiate/Maintain bed alarm  - Obtain necessary fall risk management equipment: call bell  - Apply yellow socks and bracelet for high fall risk patients  - Consider moving patient to room near nurses station  10/5/2024 1105 by Anayeli Mcmanus RN  Outcome: Progressing  10/5/2024 1100 by Anayeli Mcmanus RN  Outcome: Progressing     Problem: PAIN - ADULT  Goal: Verbalizes/displays adequate comfort level or baseline comfort level  Description: Interventions:  - Encourage patient to monitor pain and request assistance  - Assess pain using appropriate pain scale  - Administer analgesics based on type and severity of pain and evaluate response  - Implement non-pharmacological measures as appropriate and evaluate response  - Consider cultural and social influences on pain and pain management  - Notify physician/advanced practitioner if interventions unsuccessful or patient reports new pain  10/5/2024 1105 by Anayeli Mcmanus RN  Outcome: Progressing  10/5/2024 1100 by Anayeli Mcmanus RN  Outcome: Progressing     Problem: INFECTION - ADULT  Goal: Absence or prevention of progression during hospitalization  Description: INTERVENTIONS:  - Assess and monitor for signs and symptoms of infection  - Monitor lab/diagnostic results  - Monitor all insertion sites, i.e. indwelling lines, tubes, and drains  - Monitor endotracheal if appropriate and nasal secretions  for changes in amount and color  - [place] appropriate cooling/warming therapies per order  - Administer medications as ordered  - Instruct and encourage patient and family to use good hand hygiene technique  - Identify and instruct in appropriate isolation precautions for identified infection/condition  10/5/2024 1105 by Anayeli Mcmanus RN  Outcome: Progressing  10/5/2024 1100 by Anayeli Mcmanus RN  Outcome: Progressing     Problem: SAFETY ADULT  Goal: Patient will remain free of falls  Description: INTERVENTIONS:  - Educate patient/family on patient safety including physical limitations  - Instruct patient to call for assistance with activity   - Consult OT/PT to assist with strengthening/mobility   - Keep Call bell within reach  - Keep bed low and locked with side rails adjusted as appropriate  - Keep care items and personal belongings within reach  - Initiate and maintain comfort rounds  - Make Fall Risk Sign visible to staff  - Offer Toileting every 2 Hours, in advance of need  - Initiate/Maintain bed alarm  - Obtain necessary fall risk management equipment: Call bell  - Apply yellow socks and bracelet for high fall risk patients  - Consider moving patient to room near nurses station  10/5/2024 1105 by Anayeli Mcmanus RN  Outcome: Progressing  10/5/2024 1100 by Anayeli Mcmanus RN  Outcome: Progressing  Goal: Maintain or return to baseline ADL function  Description: INTERVENTIONS:  -  Assess patient's ability to carry out ADLs; assess patient's baseline for ADL function and identify physical deficits which impact ability to perform ADLs (bathing, care of mouth/teeth, toileting, grooming, dressing, etc.)  - Assess/evaluate cause of self-care deficits   - Assess range of motion  - Assess patient's mobility; develop plan if impaired  - Assess patient's need for assistive devices and provide as appropriate  - Encourage maximum independence but intervene and supervise when necessary  - Involve family in performance  of ADLs  - Assess for home care needs following discharge   - Consider OT consult to assist with ADL evaluation and planning for discharge  - Provide patient education as appropriate  10/5/2024 1105 by Anayeli Mcmanus RN  Outcome: Progressing  10/5/2024 1100 by Anayeli Mcmanus RN  Outcome: Progressing  Goal: Maintains/Returns to pre admission functional level  Description: INTERVENTIONS:  - Perform AM-PAC 6 Click Basic Mobility/ Daily Activity assessment daily.  - Set and communicate daily mobility goal to care team and patient/family/caregiver.   - Collaborate with rehabilitation services on mobility goals if consulted  - Perform Range of Motion 2 times a day.  - Reposition patient every 2 hours.  - Dangle patient 2 times a day  - Stand patient 2 times a day  - Ambulate patient 2 times a day  - Out of bed to chair 2 times a day   - Out of bed for meals 2 times a day  - Out of bed for toileting  - Record patient progress and toleration of activity level   10/5/2024 1105 by Anayeli Mcmanus RN  Outcome: Progressing  10/5/2024 1100 by Anayeli Mcmanus RN  Outcome: Progressing     Problem: DISCHARGE PLANNING  Goal: Discharge to home or other facility with appropriate resources  Description: INTERVENTIONS:  - Identify barriers to discharge w/patient and caregiver  - Arrange for needed discharge resources and transportation as appropriate  - Identify discharge learning needs (meds, wound care, etc.)  - Arrange for interpretive services to assist at discharge as needed  - Refer to Case Management Department for coordinating discharge planning if the patient needs post-hospital services based on physician/advanced practitioner order or complex needs related to functional status, cognitive ability, or social support system  10/5/2024 1105 by Anayeli Mcmanus RN  Outcome: Progressing  10/5/2024 1100 by Anayeli Mcmanus RN  Outcome: Progressing     Problem: Knowledge Deficit  Goal: Patient/family/caregiver demonstrates  understanding of disease process, treatment plan, medications, and discharge instructions  Description: Complete learning assessment and assess knowledge base.  Interventions:  - Provide teaching at level of understanding  - Provide teaching via preferred learning methods  10/5/2024 1105 by Anayeli Mcmanus RN  Outcome: Progressing  10/5/2024 1100 by Anayeli Mcmanus RN  Outcome: Progressing     Problem: NEUROSENSORY - ADULT  Goal: Remains free of injury related to seizures activity  Description: INTERVENTIONS  - Maintain airway, patient safety  and administer oxygen as ordered  - Monitor patient for seizure activity, document and report duration and description of seizure to physician/advanced practitioner  - If seizure occurs,  ensure patient safety during seizure  - Reorient patient post seizure  - Seizure pads on all 4 side rails  - Instruct patient/family to notify RN of any seizure activity including if an aura is experienced  - Instruct patient/family to call for assistance with activity based on nursing assessment  - Administer anti-seizure medications if ordered    10/5/2024 1105 by Anayeli Mcmanus RN  Outcome: Progressing  10/5/2024 1100 by Anayeli Mcmanus RN  Outcome: Progressing  Goal: Achieves maximal functionality and self care  Description: INTERVENTIONS  - Monitor swallowing and airway patency with patient fatigue and changes in neurological status  - Encourage and assist patient to increase activity and self care.   - Encourage visually impaired, hearing impaired and aphasic patients to use assistive/communication devices  10/5/2024 1105 by Anayeli Mcmanus RN  Outcome: Progressing  10/5/2024 1100 by Anayeli Mcmanus RN  Outcome: Progressing     Problem: RESPIRATORY - ADULT  Goal: Achieves optimal ventilation and oxygenation  Description: INTERVENTIONS:  - Assess for changes in respiratory status  - Assess for changes in mentation and behavior  - Position to facilitate oxygenation and minimize  respiratory effort  - Oxygen administered by appropriate delivery if ordered  - Initiate smoking cessation education as indicated  - Encourage broncho-pulmonary hygiene including cough, deep breathe, Incentive Spirometry  - Assess the need for suctioning and aspirate as needed  - Assess and instruct to report SOB or any respiratory difficulty  - Respiratory Therapy support as indicated  10/5/2024 1105 by Anayeli Mcmanus RN  Outcome: Progressing  10/5/2024 1100 by Anayeli Mcmanus RN  Outcome: Progressing     Problem: GENITOURINARY - ADULT  Goal: Maintains or returns to baseline urinary function  Description: INTERVENTIONS:  - Assess urinary function  - Encourage oral fluids to ensure adequate hydration if ordered  - Administer IV fluids as ordered to ensure adequate hydration  - Administer ordered medications as needed  - Offer frequent toileting  - Follow urinary retention protocol if ordered  10/5/2024 1105 by Anayeli Mcmanus RN  Outcome: Progressing  10/5/2024 1100 by Anayeli Mcmanus RN  Outcome: Progressing

## 2024-10-05 NOTE — ASSESSMENT & PLAN NOTE
Presented with fever coupled with tachycardia/tachypnea and thrombocytopenia, in the setting of right pyelonephritis with cystitis (see below)  Also with a mild WBC count elevation, not high enough to qualify for SIRS criteria  Lactic acid normal  1 of 2 bottles growing gram-negative rods (E. coli in early detection)  CXR unremarkable  S/p IV fluid boluses in the ED - remains hemodynamically stable now off IV fluids  Goal MAP > 65

## 2024-10-06 LAB
ANION GAP SERPL CALCULATED.3IONS-SCNC: 5 MMOL/L (ref 4–13)
ATRIAL RATE: 87 BPM
BACTERIA UR CULT: ABNORMAL
BACTERIA UR CULT: ABNORMAL
BASOPHILS # BLD AUTO: 0.03 THOUSANDS/ΜL (ref 0–0.1)
BASOPHILS NFR BLD AUTO: 1 % (ref 0–1)
BUN SERPL-MCNC: 17 MG/DL (ref 5–25)
CALCIUM SERPL-MCNC: 8.3 MG/DL (ref 8.4–10.2)
CHLORIDE SERPL-SCNC: 108 MMOL/L (ref 96–108)
CO2 SERPL-SCNC: 26 MMOL/L (ref 21–32)
CREAT SERPL-MCNC: 0.78 MG/DL (ref 0.6–1.3)
EOSINOPHIL # BLD AUTO: 0.18 THOUSAND/ΜL (ref 0–0.61)
EOSINOPHIL NFR BLD AUTO: 3 % (ref 0–6)
ERYTHROCYTE [DISTWIDTH] IN BLOOD BY AUTOMATED COUNT: 12 % (ref 11.6–15.1)
GFR SERPL CREATININE-BSD FRML MDRD: 75 ML/MIN/1.73SQ M
GLUCOSE SERPL-MCNC: 86 MG/DL (ref 65–140)
HCT VFR BLD AUTO: 33.8 % (ref 34.8–46.1)
HGB BLD-MCNC: 11.4 G/DL (ref 11.5–15.4)
IMM GRANULOCYTES # BLD AUTO: 0.03 THOUSAND/UL (ref 0–0.2)
IMM GRANULOCYTES NFR BLD AUTO: 1 % (ref 0–2)
LYMPHOCYTES # BLD AUTO: 1.08 THOUSANDS/ΜL (ref 0.6–4.47)
LYMPHOCYTES NFR BLD AUTO: 17 % (ref 14–44)
MAGNESIUM SERPL-MCNC: 1.7 MG/DL (ref 1.9–2.7)
MCH RBC QN AUTO: 32.4 PG (ref 26.8–34.3)
MCHC RBC AUTO-ENTMCNC: 33.7 G/DL (ref 31.4–37.4)
MCV RBC AUTO: 96 FL (ref 82–98)
MONOCYTES # BLD AUTO: 0.65 THOUSAND/ΜL (ref 0.17–1.22)
MONOCYTES NFR BLD AUTO: 10 % (ref 4–12)
NEUTROPHILS # BLD AUTO: 4.45 THOUSANDS/ΜL (ref 1.85–7.62)
NEUTS SEG NFR BLD AUTO: 68 % (ref 43–75)
NRBC BLD AUTO-RTO: 0 /100 WBCS
P AXIS: 49 DEGREES
PLATELET # BLD AUTO: 111 THOUSANDS/UL (ref 149–390)
PMV BLD AUTO: 9.8 FL (ref 8.9–12.7)
POTASSIUM SERPL-SCNC: 3.7 MMOL/L (ref 3.5–5.3)
PR INTERVAL: 156 MS
QRS AXIS: -12 DEGREES
QRSD INTERVAL: 92 MS
QT INTERVAL: 348 MS
QTC INTERVAL: 418 MS
RBC # BLD AUTO: 3.52 MILLION/UL (ref 3.81–5.12)
SODIUM SERPL-SCNC: 139 MMOL/L (ref 135–147)
T WAVE AXIS: 60 DEGREES
VENTRICULAR RATE: 87 BPM
WBC # BLD AUTO: 6.42 THOUSAND/UL (ref 4.31–10.16)

## 2024-10-06 PROCEDURE — 99232 SBSQ HOSP IP/OBS MODERATE 35: CPT | Performed by: INTERNAL MEDICINE

## 2024-10-06 PROCEDURE — 85025 COMPLETE CBC W/AUTO DIFF WBC: CPT | Performed by: INTERNAL MEDICINE

## 2024-10-06 PROCEDURE — 83735 ASSAY OF MAGNESIUM: CPT | Performed by: INTERNAL MEDICINE

## 2024-10-06 PROCEDURE — 97162 PT EVAL MOD COMPLEX 30 MIN: CPT

## 2024-10-06 PROCEDURE — 93010 ELECTROCARDIOGRAM REPORT: CPT | Performed by: INTERNAL MEDICINE

## 2024-10-06 PROCEDURE — 97530 THERAPEUTIC ACTIVITIES: CPT

## 2024-10-06 PROCEDURE — 80048 BASIC METABOLIC PNL TOTAL CA: CPT | Performed by: INTERNAL MEDICINE

## 2024-10-06 RX ORDER — POTASSIUM CHLORIDE 1500 MG/1
40 TABLET, EXTENDED RELEASE ORAL ONCE
Status: COMPLETED | OUTPATIENT
Start: 2024-10-06 | End: 2024-10-06

## 2024-10-06 RX ORDER — MAGNESIUM SULFATE HEPTAHYDRATE 40 MG/ML
2 INJECTION, SOLUTION INTRAVENOUS ONCE
Status: COMPLETED | OUTPATIENT
Start: 2024-10-06 | End: 2024-10-06

## 2024-10-06 RX ADMIN — MAGNESIUM SULFATE HEPTAHYDRATE 2 G: 40 INJECTION, SOLUTION INTRAVENOUS at 10:44

## 2024-10-06 RX ADMIN — LEVETIRACETAM 750 MG: 500 TABLET, FILM COATED ORAL at 17:00

## 2024-10-06 RX ADMIN — APIXABAN 2.5 MG: 2.5 TABLET, FILM COATED ORAL at 17:00

## 2024-10-06 RX ADMIN — CARVEDILOL 6.25 MG: 6.25 TABLET, FILM COATED ORAL at 08:40

## 2024-10-06 RX ADMIN — LEVETIRACETAM 750 MG: 500 TABLET, FILM COATED ORAL at 08:40

## 2024-10-06 RX ADMIN — PYRIDOXINE HCL TAB 50 MG 100 MG: 50 TAB at 08:40

## 2024-10-06 RX ADMIN — CARVEDILOL 6.25 MG: 6.25 TABLET, FILM COATED ORAL at 17:00

## 2024-10-06 RX ADMIN — APIXABAN 2.5 MG: 2.5 TABLET, FILM COATED ORAL at 08:40

## 2024-10-06 RX ADMIN — POTASSIUM CHLORIDE 40 MEQ: 1500 TABLET, EXTENDED RELEASE ORAL at 10:43

## 2024-10-06 RX ADMIN — CEFTRIAXONE 2000 MG: 2 INJECTION, SOLUTION INTRAVENOUS at 15:34

## 2024-10-06 RX ADMIN — CLOBAZAM 20 MG: 10 TABLET ORAL at 20:25

## 2024-10-06 NOTE — PROGRESS NOTES
"Progress Note - Hospitalist   Name: Emily Martines 74 y.o. female I MRN: [id number]  Unit/Bed#: 2 [address] Date of Admission: 10/4/2024   Date of Service: 10/6/2024 I Hospital Day: 2    Assessment & Plan  Sepsis (HCC)  Presented with fever coupled with tachycardia/tachypnea and thrombocytopenia, in the setting of right pyelonephritis with cystitis (see below)  Also with a mild WBC count elevation, not high enough to qualify for SIRS criteria  Lactic acid normal  1 of 2 bottles growing E. coli   CXR unremarkable  S/p IV fluid boluses in the ED - remains hemodynamically stable now off IV fluids  Goal MAP > 65   Pyelonephritis of right kidney  As evidenced on CT imaging with associated cystitis  Urinalysis noting significant pyuria with moderate bacteria and positive nitrites  Given a dose of IV Levaquin in the ED due to concern over a listed allergy of \"hives\" to PCNs -> low cross-reactivity between PCNs and cephalosporins -> transitioned antibiotic coverage to IV Rocephin - can transition to an oral cephalosporin, per sensitivities, with hopeful/tentative discharge tomorrow  Of note, patient did have a localized \"hive-type\" erythema and mild irritation around IV site to fluoroquinolone (Levaquin) administration - as stated above, antibiotics switched now   PRN pain/emesis control  Gram-negative bacteremia  1 of 2 bottles growing E. coli - likely associated with pyelonephritis/cystitis  On IV Rocephin  Acute metabolic encephalopathy  Transient alteration of mentation in the setting of sepsis from pyelonephritis/cystitis  Blood sugars stable  Electrolytes reviewed -> hypomagnesemia noted status post repletion  Serial neurochecks along with treatment of underlying infection  If altered mentation recurs or worsens, may consider CT imaging to rule out intracranial etiology (less likely clinical concern at this time)  Currently at baseline  History of pulmonary embolus (PE)  Continue Eliquis  Primary " hypertension  Continue Coreg  Hypomagnesemia  Monitor/replete serum magnesium and potassium as necessary  Seizure disorder (HCC)  Continue Keppra - PRN IV Ativan on board for breakthrough episodes while hospitalized  Also on Clobazam at home (not available on hospital formulary)  History of traumatic brain injury status post craniotomy as precipitating factor noted  History of traumatic brain injury  Status post craniotomy in 2017  Thrombocytopenia (HCC)  Monitor platelet count - monitor for bleeding  Suspect associated with presenting sepsis      VTE Pharmacologic Prophylaxis: VTE Score: 5 High Risk (Score >/= 5) - Pharmacological DVT Prophylaxis Ordered: Apixaban (Eliquis). Sequential Compression Devices Ordered.    AM-PAC Basic Mobility:  Basic Mobility Inpatient Raw Score: 19  JH-HLM Goal: 6: Walk 10 steps or more  JH-HLM Achieved: 6: Walk 10 steps or more  JH-HLM Goal NOT achieved. Continue with multidisciplinary rounding and encourage appropriate mobility to improve upon JH-HLM goals.    Patient Centered Rounds:  I have performed bedside rounds and discussed plan of care with nursing today.  Discussions with Specialists or Other Care Team Provider:  see above assessments if applicable    Education and Discussions with Family / Patient:  Patient at bedside, who will self update contacts, as necessary    Time Spent for Care:  35 minutes. More than 50% of total time spent on counseling and coordination of care, on one or more of the following: performing physical exam; counseling and coordination of care, obtaining or reviewing history, documenting in the medical record, reviewing/ordering tests/medications/procedures, and communicating with other healthcare professionals.    Current Length of Stay: 2 day(s)  Current Patient Status: Inpatient   Certification Statement:  Patient will continue to require additional hospital stay due to assessments as noted above.    Code Status: Level 3 - DNAR and  DNI      Subjective     Seen and examined this morning.  No new complaints this time.  Denies fever/chills or worsening abdominal pain/flank tenderness.      Objective     Vitals:   Temp (24hrs), Av.6 °F (36.4 °C), Min:97.3 °F (36.3 °C), Max:97.8 °F (36.6 °C)    Temp:  [97.3 °F (36.3 °C)-97.8 °F (36.6 °C)] 97.3 °F (36.3 °C)  HR:  [65-72] 68  Resp:  [16-19] 18  BP: (119-142)/(62-78) 119/76  SpO2:  [93 %-97 %] 93 %  Body mass index is 35.2 kg/m².     Input and Output Summary (last 24 hours):       Intake/Output Summary (Last 24 hours) at 10/6/2024 1206  Last data filed at 10/6/2024 0731  Gross per 24 hour   Intake 10 ml   Output --   Net 10 ml       Physical Exam:     GENERAL Waxes waning but generally improved weakness/fatigue   HEAD   Normocephalic - atraumatic   EYES Nonicteric   MOUTH   Mucosa moist   NECK   Supple - full range of motion   CARDIAC Rate controlled   PULMONARY Fairly clear to auscultation without labored respirations   ABDOMEN Nontender/nondistended - no current costovertebral tenderness   MUSCULOSKELETAL   Motor strength/range of motion deconditioned   NEUROLOGIC   Alert/oriented at baseline   PSYCHIATRIC   Mood/affect stable         Labs & Recent Cultures:  Results from last 7 days   Lab Units 10/06/24  0509 10/05/24  0602 10/04/24  1314   WBC Thousand/uL 6.42   < > 10.69*   HEMOGLOBIN g/dL 11.4*   < > 13.4   HEMATOCRIT % 33.8*   < > 40.6   PLATELETS Thousands/uL 111*   < > 124*   BANDS PCT %  --   --  7   SEGS PCT % 68   < >  --    LYMPHO PCT % 17   < > 2*   MONO PCT % 10   < > 1*   EOS PCT % 3   < > 0    < > = values in this interval not displayed.     Results from last 7 days   Lab Units 10/06/24  0509 10/05/24  0602 10/04/24  1314   POTASSIUM mmol/L 3.7   < > 3.7   CHLORIDE mmol/L 108   < > 102   CO2 mmol/L 26   < > 26   BUN mg/dL 17   < > 16   CREATININE mg/dL 0.78   < > 0.91   CALCIUM mg/dL 8.3*   < > 8.9   ALK PHOS U/L  --   --  69   ALT U/L  --   --  11   AST U/L  --   --  19    < > =  values in this interval not displayed.     Results from last 7 days   Lab Units 10/04/24  1314   INR  1.15     Results from last 7 days   Lab Units 10/04/24  1305   POC GLUCOSE mg/dl 112         Results from last 7 days   Lab Units 10/04/24  1314   LACTIC ACID mmol/L 1.0         Results from last 7 days   Lab Units 10/04/24  1344 10/04/24  1329 10/04/24  1314   BLOOD CULTURE  Escherichia coli*  --  No Growth at 24 hrs.   GRAM STAIN RESULT  Gram negative rods*  --   --    URINE CULTURE   --  >100,000 cfu/ml Escherichia coli*  60,000-69,000 cfu/ml Klebsiella pneumoniae*  --          Lines/Drains/Telemetry:  Invasive Devices       Peripheral Intravenous Line  Duration             Peripheral IV 10/05/24 Distal;Left;Upper;Ventral (anterior) Arm <1 day                    Last 24 Hours Medication List:   Current Facility-Administered Medications   Medication Dose Route Frequency Provider Last Rate    acetaminophen  650 mg Oral Q6H PRN Lila Taylor MD      apixaban  2.5 mg Oral BID Lila Taylor MD      carvedilol  6.25 mg Oral BID Lila Taylor MD      cefTRIAXone  2,000 mg Intravenous Q24H Lila Taylor MD Stopped (10/05/24 1744)    cloBAZam  20 mg Oral HS PEDRO Vasques      levETIRAcetam  750 mg Oral BID Lila Taylor MD      LORazepam  2 mg Intravenous Q4H PRN Lila Taylor MD      magnesium sulfate  2 g Intravenous Once Lila Taylor MD 2 g (10/06/24 1044)    ondansetron  4 mg Intravenous Q4H PRN Lila Taylor MD      vitamin B-6  100 mg Oral Daily MD LILA Gan MD   Hospitalist - [facility]        ** Please Note:  Documentation is constructed using a voice recognition dictation system.  An occasional wrong word/phrase or “sound-a-like” substitution may have been picked up by dictation device due to the inherent limitations of voice recognition software.  Read the chart carefully and recognize, using reasonable context, where substitutions may have occurred.**

## 2024-10-06 NOTE — PLAN OF CARE
Problem: PHYSICAL THERAPY ADULT  Goal: Performs mobility at highest level of function for planned discharge setting.  See evaluation for individualized goals.  Description: Treatment/Interventions: LE strengthening/ROM, Elevations, Functional transfer training, ADL retraining, Gait training, Equipment eval/education, Patient/family training, Therapeutic exercise, Endurance training  Equipment Recommended:  (Pt has a rollator)       See flowsheet documentation for full assessment, interventions and recommendations.  Note: Prognosis: Good  Problem List: Decreased strength, Impaired balance, Decreased mobility  Assessment: Pt is 74 y.o. female seen for PT evaluation s/p admit to [facility] on 10/4/2024 w/ Sepsis (HCC). PT consulted to assess pt's functional mobility and d/c needs. Order placed for PT eval and tx, w/ up and OOB as tolerated order.  Co-morbidities affecting patient's physical performance include: seizures, TBI.  Personal factors affecting patient at time of initial evaluation include: ambulating with assistive device and decreased cognition.     Prior to admission, patient was independent with functional mobility with rollator walker .  Upon evaluation: Pt ambulated with supervision assist with a walker.      Please find objective findings from Physical Therapy assessment regarding body systems outlined above with impairments and limitations including weakness, impaired balance, gait deviations, and fall risk.The Barthel Index was used as a functional outcome tool presenting with a score of Barthel Index Score: 80 today indicating moderate limitations of functional mobility and ADLS.  Patient's clinical presentation is currently evolving as seen in patient's presentation of increased fall risk, new onset of impairment of functional mobility, and new onset of weakness. Pt would benefit from continued Physical Therapy treatment to address deficits as defined above and maximize level of functional  mobility.     As demonstrated by objective findings, the assigned level of complexity for this evaluation is moderate.The patient's AM-PAC Basic Mobility Inpatient Short Form Raw Score is 21. A Raw score of greater than 16 suggests the patient may benefit from discharge to home. Please also refer to the recommendation of the Physical Therapist for safe discharge planning.        Rehab Resource Intensity Level, PT: No post-acute rehabilitation needs    See flowsheet documentation for full assessment.

## 2024-10-06 NOTE — ASSESSMENT & PLAN NOTE
Transient alteration of mentation in the setting of sepsis from pyelonephritis/cystitis  Blood sugars stable  Electrolytes reviewed -> hypomagnesemia noted status post repletion  Serial neurochecks along with treatment of underlying infection  If altered mentation recurs or worsens, may consider CT imaging to rule out intracranial etiology (less likely clinical concern at this time)  Currently at baseline

## 2024-10-06 NOTE — PHYSICAL THERAPY NOTE
"   PT EVALUATION     10/06/24 1330   PT Last Visit   PT Visit Date 10/06/24   Note Type   Note type Evaluation   Pain Assessment   Pain Assessment Tool 0-10   Pain Score No Pain   Restrictions/Precautions   Other Precautions Fall Risk;Seizure  (hx TBI)   Home Living   Type of Home House   Home Layout One level  (1 JOYCE)   Home Equipment   (rollator)   Prior Function   Level of [place] Independent with ADLs;Independent with functional mobility  (Pt ambulate with a rollator.)   Lives With Daughter;Family   Receives Help From Family   Falls in the last 6 months 0   General   Additional Pertinent History Pt admitted with fever/weakness/pyelonephritis.  Pt reports she is feeling much better and hoping to go home soon.   Family/Caregiver Present   (daughter present for first part of session)   Cognition   Overall Cognitive Status WFL   Arousal/Participation Alert   Orientation Level Oriented X4   Following Commands Follows all commands and directions without difficulty   Subjective   Subjective \"I can walk\"   RLE Assessment   RLE Assessment   (ROM WFL, MMT 4+/5)   LLE Assessment   LLE Assessment   (ROM WFL, MMT 4+/5)   Transfers   Sit to Stand 5  Supervision   Stand to Sit 5  Supervision   Stand pivot 5  Supervision   Additional items   (with walker)   Toilet transfer 5  Supervision   Additional items   (commode over toilet)   Ambulation/Elevation   Gait pattern   (slow jone)   Gait Assistance 5  Supervision   Assistive Device Rolling walker   Distance 30 feet   Balance   Static Sitting Fair +   Static Standing Fair +   Ambulatory Fair   Assessment   Prognosis Good   Problem List Decreased strength;Impaired balance;Decreased mobility   Assessment Pt is 74 y.o. female seen for PT evaluation s/p admit to [facility] on 10/4/2024 w/ Sepsis (HCC). PT consulted to assess pt's functional mobility and d/c needs. Order placed for PT eval and tx, w/ up and OOB as tolerated order.  Co-morbidities affecting patient's " "physical performance include: seizures, TBI.  Personal factors affecting patient at time of initial evaluation include: ambulating with assistive device and decreased cognition.         Prior to admission, patient was independent with functional mobility with rollator walker .  Upon evaluation: Pt ambulated with supervision assist with a walker.          Please find objective findings from Physical Therapy assessment regarding body systems outlined above with impairments and limitations including weakness, impaired balance, gait deviations, and fall risk.The Barthel Index was used as a functional outcome tool presenting with a score of Barthel Index Score: 80 today indicating moderate limitations of functional mobility and ADLS.  Patient's clinical presentation is currently evolving as seen in patient's presentation of increased fall risk, new onset of impairment of functional mobility, and new onset of weakness. Pt would benefit from continued Physical Therapy treatment to address deficits as defined above and maximize level of functional mobility. As demonstrated by objective findings, the assigned level of complexity for this evaluation is moderate.    The patient's -[facility] Basic Mobility Inpatient Short Form Raw Score is 21. A Raw score of greater than 16 suggests the patient may benefit from discharge to home. Please also refer to the recommendation of the Physical Therapist for safe discharge planning.   Goals   Patient Goals \"go home tomorrow\"   STG Expiration Date 10/13/24   Short Term Goal #1 1. independent transfers,   2. modified independent ambulation with a rollator 200 feet indoor surfaces,   3. supervision up and down 1 step so pt can enter and exit her home   LTG Expiration Date 10/20/24   Long Term Goal #1 1. modified independent ambulation with a rollator walker 300 feet outdoor level surfaces so pt can \"take a walk\" outside   Plan   Treatment/Interventions LE strengthening/ROM;Elevations;Functional " "transfer training;ADL retraining;Gait training;Equipment eval/education;Patient/family training;Therapeutic exercise;Endurance training   PT Frequency   (5x/week)   Discharge Recommendation   Rehab Resource Intensity Level, PT No post-acute rehabilitation needs   Equipment Recommended   (Pt has a rollator)   AM-PAC Basic Mobility Inpatient   Turning in Flat Bed Without Bedrails 4   Lying on Back to Sitting on Edge of Flat Bed Without Bedrails 4   Moving Bed to Chair 3   Standing Up From Chair Using Arms 4   Walk in Room 3   Climb 3-5 Stairs With Railing 3   Basic Mobility Inpatient Raw Score 21   Basic Mobility Standardized Score 45.55   [facility] Highest Level Of Mobility   -HLM Goal 6: Walk 10 steps or more   -HLM Achieved 8: Walk 250 feet ot more   Barthel Index   Feeding 10   Bathing 0   Grooming Score 5   Dressing Score 5   Bladder Score 10   Bowels Score 10   Toilet Use Score 10   Transfers (Bed/Chair) Score 15   Mobility (Level Surface) Score 10   Stairs Score 5   Barthel Index Score 80   Additional Treatment Session   Start Time 1315   End Time 1330   Treatment Assessment S:  \"I can walk more\"   O: Pt agreed to ambulate in the hallway.  Pt ambulated x 250 feet with a rolling walker with supervision assist.  A:  Pt demonstrates a slow steady gait with the walker.  Pt appears to be near her baseline level of function.   P: Encourage OOB/ambulation with walker with nursing staff   End of Consult   Patient Position at End of Consult All needs within reach;Bed/Chair alarm activated;Bedside chair   Licensure   [place] License Number  Sharda Ernstavelina PT [id number]     "

## 2024-10-06 NOTE — PLAN OF CARE
Problem: Potential for Falls  Goal: Patient will remain free of falls  Description: INTERVENTIONS:  - Educate patient/family on patient safety including physical limitations  - Instruct patient to call for assistance with activity   - Consult OT/PT to assist with strengthening/mobility   - Keep Call bell within reach  - Keep bed low and locked with side rails adjusted as appropriate  - Keep care items and personal belongings within reach  - Initiate and maintain comfort rounds  - Make Fall Risk Sign visible to staff  - Offer Toileting every  Hours, in advance of need  - Initiate/Maintain alarm  - Obtain necessary fall risk management equipment:   - Apply yellow socks and bracelet for high fall risk patients  - Consider moving patient to room near nurses station  Outcome: Progressing     Problem: PAIN - ADULT  Goal: Verbalizes/displays adequate comfort level or baseline comfort level  Description: Interventions:  - Encourage patient to monitor pain and request assistance  - Assess pain using appropriate pain scale  - Administer analgesics based on type and severity of pain and evaluate response  - Implement non-pharmacological measures as appropriate and evaluate response  - Consider cultural and social influences on pain and pain management  - Notify physician/advanced practitioner if interventions unsuccessful or patient reports new pain  Outcome: Progressing     Problem: INFECTION - ADULT  Goal: Absence or prevention of progression during hospitalization  Description: INTERVENTIONS:  - Assess and monitor for signs and symptoms of infection  - Monitor lab/diagnostic results  - Monitor all insertion sites, i.e. indwelling lines, tubes, and drains  - Monitor endotracheal if appropriate and nasal secretions for changes in amount and color  - [place] appropriate cooling/warming therapies per order  - Administer medications as ordered  - Instruct and encourage patient and family to use good hand hygiene technique  -  Identify and instruct in appropriate isolation precautions for identified infection/condition  Outcome: Progressing     Problem: SAFETY ADULT  Goal: Patient will remain free of falls  Description: INTERVENTIONS:  - Educate patient/family on patient safety including physical limitations  - Instruct patient to call for assistance with activity   - Consult OT/PT to assist with strengthening/mobility   - Keep Call bell within reach  - Keep bed low and locked with side rails adjusted as appropriate  - Keep care items and personal belongings within reach  - Initiate and maintain comfort rounds  - Make Fall Risk Sign visible to staff  - Offer Toileting every  Hours, in advance of need  - Initiate/Maintain alarm  - Obtain necessary fall risk management equipment:   - Apply yellow socks and bracelet for high fall risk patients  - Consider moving patient to room near nurses station  Outcome: Progressing  Goal: Maintain or return to baseline ADL function  Description: INTERVENTIONS:  -  Assess patient's ability to carry out ADLs; assess patient's baseline for ADL function and identify physical deficits which impact ability to perform ADLs (bathing, care of mouth/teeth, toileting, grooming, dressing, etc.)  - Assess/evaluate cause of self-care deficits   - Assess range of motion  - Assess patient's mobility; develop plan if impaired  - Assess patient's need for assistive devices and provide as appropriate  - Encourage maximum independence but intervene and supervise when necessary  - Involve family in performance of ADLs  - Assess for home care needs following discharge   - Consider OT consult to assist with ADL evaluation and planning for discharge  - Provide patient education as appropriate  Outcome: Progressing  Goal: Maintains/Returns to pre admission functional level  Description: INTERVENTIONS:  - Perform AM-PAC 6 Click Basic Mobility/ Daily Activity assessment daily.  - Set and communicate daily mobility goal to care  team and patient/family/caregiver.   - Collaborate with rehabilitation services on mobility goals if consulted  - Perform Range of Motion  times a day.  - Reposition patient every  hours.  - Dangle patient  times a day  - Stand patient  times a day  - Ambulate patient  times a day  - Out of bed to chair  times a day   - Out of bed for meals  times a day  - Out of bed for toileting  - Record patient progress and toleration of activity level   Outcome: Progressing     Problem: DISCHARGE PLANNING  Goal: Discharge to home or other facility with appropriate resources  Description: INTERVENTIONS:  - Identify barriers to discharge w/patient and caregiver  - Arrange for needed discharge resources and transportation as appropriate  - Identify discharge learning needs (meds, wound care, etc.)  - Arrange for interpretive services to assist at discharge as needed  - Refer to Case Management Department for coordinating discharge planning if the patient needs post-hospital services based on physician/advanced practitioner order or complex needs related to functional status, cognitive ability, or social support system  Outcome: Progressing     Problem: Knowledge Deficit  Goal: Patient/family/caregiver demonstrates understanding of disease process, treatment plan, medications, and discharge instructions  Description: Complete learning assessment and assess knowledge base.  Interventions:  - Provide teaching at level of understanding  - Provide teaching via preferred learning methods  Outcome: Progressing     Problem: NEUROSENSORY - ADULT  Goal: Remains free of injury related to seizures activity  Description: INTERVENTIONS  - Maintain airway, patient safety  and administer oxygen as ordered  - Monitor patient for seizure activity, document and report duration and description of seizure to physician/advanced practitioner  - If seizure occurs,  ensure patient safety during seizure  - Reorient patient post seizure  - Seizure pads on  all 4 side rails  - Instruct patient/family to notify RN of any seizure activity including if an aura is experienced  - Instruct patient/family to call for assistance with activity based on nursing assessment  - Administer anti-seizure medications if ordered    Outcome: Progressing  Goal: Achieves maximal functionality and self care  Description: INTERVENTIONS  - Monitor swallowing and airway patency with patient fatigue and changes in neurological status  - Encourage and assist patient to increase activity and self care.   - Encourage visually impaired, hearing impaired and aphasic patients to use assistive/communication devices  Outcome: Progressing     Problem: RESPIRATORY - ADULT  Goal: Achieves optimal ventilation and oxygenation  Description: INTERVENTIONS:  - Assess for changes in respiratory status  - Assess for changes in mentation and behavior  - Position to facilitate oxygenation and minimize respiratory effort  - Oxygen administered by appropriate delivery if ordered  - Initiate smoking cessation education as indicated  - Encourage broncho-pulmonary hygiene including cough, deep breathe, Incentive Spirometry  - Assess the need for suctioning and aspirate as needed  - Assess and instruct to report SOB or any respiratory difficulty  - Respiratory Therapy support as indicated  Outcome: Progressing     Problem: GENITOURINARY - ADULT  Goal: Maintains or returns to baseline urinary function  Description: INTERVENTIONS:  - Assess urinary function  - Encourage oral fluids to ensure adequate hydration if ordered  - Administer IV fluids as ordered to ensure adequate hydration  - Administer ordered medications as needed  - Offer frequent toileting  - Follow urinary retention protocol if ordered  Outcome: Progressing

## 2024-10-06 NOTE — PLAN OF CARE
Problem: Potential for Falls  Goal: Patient will remain free of falls  Description: INTERVENTIONS:  - Educate patient/family on patient safety including physical limitations  - Instruct patient to call for assistance with activity   - Consult OT/PT to assist with strengthening/mobility   - Keep Call bell within reach  - Keep bed low and locked with side rails adjusted as appropriate  - Keep care items and personal belongings within reach  - Initiate and maintain comfort rounds  - Make Fall Risk Sign visible to staff  - Offer Toileting every 2 Hours, in advance of need  - Initiate/Maintain bed alarm  - Obtain necessary fall risk management equipment: Call bell  - Apply yellow socks and bracelet for high fall risk patients  - Consider moving patient to room near nurses station  Outcome: Progressing     Problem: PAIN - ADULT  Goal: Verbalizes/displays adequate comfort level or baseline comfort level  Description: Interventions:  - Encourage patient to monitor pain and request assistance  - Assess pain using appropriate pain scale  - Administer analgesics based on type and severity of pain and evaluate response  - Implement non-pharmacological measures as appropriate and evaluate response  - Consider cultural and social influences on pain and pain management  - Notify physician/advanced practitioner if interventions unsuccessful or patient reports new pain  Outcome: Progressing     Problem: INFECTION - ADULT  Goal: Absence or prevention of progression during hospitalization  Description: INTERVENTIONS:  - Assess and monitor for signs and symptoms of infection  - Monitor lab/diagnostic results  - Monitor all insertion sites, i.e. indwelling lines, tubes, and drains  - Monitor endotracheal if appropriate and nasal secretions for changes in amount and color  - [place] appropriate cooling/warming therapies per order  - Administer medications as ordered  - Instruct and encourage patient and family to use good hand  hygiene technique  - Identify and instruct in appropriate isolation precautions for identified infection/condition  Outcome: Progressing     Problem: SAFETY ADULT  Goal: Patient will remain free of falls  Description: INTERVENTIONS:  - Educate patient/family on patient safety including physical limitations  - Instruct patient to call for assistance with activity   - Consult OT/PT to assist with strengthening/mobility   - Keep Call bell within reach  - Keep bed low and locked with side rails adjusted as appropriate  - Keep care items and personal belongings within reach  - Initiate and maintain comfort rounds  - Make Fall Risk Sign visible to staff  - Offer Toileting every 2 Hours, in advance of need  - Initiate/Maintain bed alarm  - Obtain necessary fall risk management equipment: Call bell  - Apply yellow socks and bracelet for high fall risk patients  - Consider moving patient to room near nurses station  Outcome: Progressing  Goal: Maintain or return to baseline ADL function  Description: INTERVENTIONS:  -  Assess patient's ability to carry out ADLs; assess patient's baseline for ADL function and identify physical deficits which impact ability to perform ADLs (bathing, care of mouth/teeth, toileting, grooming, dressing, etc.)  - Assess/evaluate cause of self-care deficits   - Assess range of motion  - Assess patient's mobility; develop plan if impaired  - Assess patient's need for assistive devices and provide as appropriate  - Encourage maximum independence but intervene and supervise when necessary  - Involve family in performance of ADLs  - Assess for home care needs following discharge   - Consider OT consult to assist with ADL evaluation and planning for discharge  - Provide patient education as appropriate  Outcome: Progressing  Goal: Maintains/Returns to pre admission functional level  Description: INTERVENTIONS:  - Perform AM-PAC 6 Click Basic Mobility/ Daily Activity assessment daily.  - Set and  communicate daily mobility goal to care team and patient/family/caregiver.   - Collaborate with rehabilitation services on mobility goals if consulted  - Perform Range of Motion 2 times a day.  - Reposition patient every 2 hours.  - Dangle patient 2 times a day  - Stand patient 2 times a day  - Ambulate patient 2 times a day  - Out of bed to chair 2 times a day   - Out of bed for meals 2 times a day  - Out of bed for toileting  - Record patient progress and toleration of activity level   Outcome: Progressing     Problem: DISCHARGE PLANNING  Goal: Discharge to home or other facility with appropriate resources  Description: INTERVENTIONS:  - Identify barriers to discharge w/patient and caregiver  - Arrange for needed discharge resources and transportation as appropriate  - Identify discharge learning needs (meds, wound care, etc.)  - Arrange for interpretive services to assist at discharge as needed  - Refer to Case Management Department for coordinating discharge planning if the patient needs post-hospital services based on physician/advanced practitioner order or complex needs related to functional status, cognitive ability, or social support system  Outcome: Progressing     Problem: Knowledge Deficit  Goal: Patient/family/caregiver demonstrates understanding of disease process, treatment plan, medications, and discharge instructions  Description: Complete learning assessment and assess knowledge base.  Interventions:  - Provide teaching at level of understanding  - Provide teaching via preferred learning methods  Outcome: Progressing     Problem: NEUROSENSORY - ADULT  Goal: Remains free of injury related to seizures activity  Description: INTERVENTIONS  - Maintain airway, patient safety  and administer oxygen as ordered  - Monitor patient for seizure activity, document and report duration and description of seizure to physician/advanced practitioner  - If seizure occurs,  ensure patient safety during seizure  -  Reorient patient post seizure  - Seizure pads on all 4 side rails  - Instruct patient/family to notify RN of any seizure activity including if an aura is experienced  - Instruct patient/family to call for assistance with activity based on nursing assessment  - Administer anti-seizure medications if ordered    Outcome: Progressing  Goal: Achieves maximal functionality and self care  Description: INTERVENTIONS  - Monitor swallowing and airway patency with patient fatigue and changes in neurological status  - Encourage and assist patient to increase activity and self care.   - Encourage visually impaired, hearing impaired and aphasic patients to use assistive/communication devices  Outcome: Progressing     Problem: RESPIRATORY - ADULT  Goal: Achieves optimal ventilation and oxygenation  Description: INTERVENTIONS:  - Assess for changes in respiratory status  - Assess for changes in mentation and behavior  - Position to facilitate oxygenation and minimize respiratory effort  - Oxygen administered by appropriate delivery if ordered  - Initiate smoking cessation education as indicated  - Encourage broncho-pulmonary hygiene including cough, deep breathe, Incentive Spirometry  - Assess the need for suctioning and aspirate as needed  - Assess and instruct to report SOB or any respiratory difficulty  - Respiratory Therapy support as indicated  Outcome: Progressing     Problem: GENITOURINARY - ADULT  Goal: Maintains or returns to baseline urinary function  Description: INTERVENTIONS:  - Assess urinary function  - Encourage oral fluids to ensure adequate hydration if ordered  - Administer IV fluids as ordered to ensure adequate hydration  - Administer ordered medications as needed  - Offer frequent toileting  - Follow urinary retention protocol if ordered  Outcome: Progressing

## 2024-10-06 NOTE — ASSESSMENT & PLAN NOTE
"As evidenced on CT imaging with associated cystitis  Urinalysis noting significant pyuria with moderate bacteria and positive nitrites  Given a dose of IV Levaquin in the ED due to concern over a listed allergy of \"hives\" to PCNs -> low cross-reactivity between PCNs and cephalosporins -> transitioned antibiotic coverage to IV Rocephin - can transition to an oral cephalosporin, per sensitivities, with hopeful/tentative discharge tomorrow  Of note, patient did have a localized \"hive-type\" erythema and mild irritation around IV site to fluoroquinolone (Levaquin) administration - as stated above, antibiotics switched now   PRN pain/emesis control  "

## 2024-10-06 NOTE — ASSESSMENT & PLAN NOTE
Presented with fever coupled with tachycardia/tachypnea and thrombocytopenia, in the setting of right pyelonephritis with cystitis (see below)  Also with a mild WBC count elevation, not high enough to qualify for SIRS criteria  Lactic acid normal  1 of 2 bottles growing E. coli   CXR unremarkable  S/p IV fluid boluses in the ED - remains hemodynamically stable now off IV fluids  Goal MAP > 65

## 2024-10-07 VITALS
HEIGHT: 68 IN | TEMPERATURE: 97.7 F | SYSTOLIC BLOOD PRESSURE: 146 MMHG | DIASTOLIC BLOOD PRESSURE: 72 MMHG | RESPIRATION RATE: 18 BRPM | OXYGEN SATURATION: 96 % | HEART RATE: 61 BPM | BODY MASS INDEX: 35.08 KG/M2 | WEIGHT: 231.48 LBS

## 2024-10-07 PROBLEM — A41.9 SEPSIS (HCC): Status: RESOLVED | Noted: 2024-10-04 | Resolved: 2024-10-07

## 2024-10-07 LAB
ANION GAP SERPL CALCULATED.3IONS-SCNC: 5 MMOL/L (ref 4–13)
BACTERIA BLD CULT: ABNORMAL
BUN SERPL-MCNC: 15 MG/DL (ref 5–25)
CALCIUM SERPL-MCNC: 8.4 MG/DL (ref 8.4–10.2)
CHLORIDE SERPL-SCNC: 108 MMOL/L (ref 96–108)
CO2 SERPL-SCNC: 26 MMOL/L (ref 21–32)
CREAT SERPL-MCNC: 0.67 MG/DL (ref 0.6–1.3)
E COLI DNA BLD POS QL NAA+NON-PROBE: DETECTED
ERYTHROCYTE [DISTWIDTH] IN BLOOD BY AUTOMATED COUNT: 11.9 % (ref 11.6–15.1)
GFR SERPL CREATININE-BSD FRML MDRD: 87 ML/MIN/1.73SQ M
GLUCOSE SERPL-MCNC: 87 MG/DL (ref 65–140)
GRAM STN SPEC: ABNORMAL
HCT VFR BLD AUTO: 34.8 % (ref 34.8–46.1)
HGB BLD-MCNC: 11.4 G/DL (ref 11.5–15.4)
MAGNESIUM SERPL-MCNC: 1.8 MG/DL (ref 1.9–2.7)
MCH RBC QN AUTO: 31.7 PG (ref 26.8–34.3)
MCHC RBC AUTO-ENTMCNC: 32.8 G/DL (ref 31.4–37.4)
MCV RBC AUTO: 97 FL (ref 82–98)
NRBC BLD AUTO-RTO: 0 /100 WBCS
PLATELET # BLD AUTO: 128 THOUSANDS/UL (ref 149–390)
PMV BLD AUTO: 10 FL (ref 8.9–12.7)
POTASSIUM SERPL-SCNC: 4.1 MMOL/L (ref 3.5–5.3)
RBC # BLD AUTO: 3.6 MILLION/UL (ref 3.81–5.12)
SODIUM SERPL-SCNC: 139 MMOL/L (ref 135–147)
WBC # BLD AUTO: 5.37 THOUSAND/UL (ref 4.31–10.16)

## 2024-10-07 PROCEDURE — 85027 COMPLETE CBC AUTOMATED: CPT | Performed by: INTERNAL MEDICINE

## 2024-10-07 PROCEDURE — 80048 BASIC METABOLIC PNL TOTAL CA: CPT | Performed by: INTERNAL MEDICINE

## 2024-10-07 PROCEDURE — 99239 HOSP IP/OBS DSCHRG MGMT >30: CPT

## 2024-10-07 PROCEDURE — 83735 ASSAY OF MAGNESIUM: CPT | Performed by: INTERNAL MEDICINE

## 2024-10-07 RX ORDER — LANOLIN ALCOHOL/MO/W.PET/CERES
400 CREAM (GRAM) TOPICAL 2 TIMES DAILY
Status: DISCONTINUED | OUTPATIENT
Start: 2024-10-07 | End: 2024-10-07 | Stop reason: HOSPADM

## 2024-10-07 RX ORDER — CEFPODOXIME PROXETIL 200 MG/1
200 TABLET, FILM COATED ORAL 2 TIMES DAILY
Qty: 14 TABLET | Refills: 0 | Status: SHIPPED | OUTPATIENT
Start: 2024-10-08 | End: 2024-10-15

## 2024-10-07 RX ADMIN — Medication 400 MG: at 12:54

## 2024-10-07 RX ADMIN — CARVEDILOL 6.25 MG: 6.25 TABLET, FILM COATED ORAL at 08:21

## 2024-10-07 RX ADMIN — PYRIDOXINE HCL TAB 50 MG 100 MG: 50 TAB at 08:22

## 2024-10-07 RX ADMIN — LEVETIRACETAM 750 MG: 500 TABLET, FILM COATED ORAL at 08:20

## 2024-10-07 RX ADMIN — CEFTRIAXONE 2000 MG: 2 INJECTION, SOLUTION INTRAVENOUS at 15:45

## 2024-10-07 RX ADMIN — APIXABAN 2.5 MG: 2.5 TABLET, FILM COATED ORAL at 08:21

## 2024-10-07 NOTE — ASSESSMENT & PLAN NOTE
Presented with fever coupled with tachycardia/tachypnea and thrombocytopenia, in the setting of right pyelonephritis with cystitis (see below)  Also with a mild WBC count elevation, not high enough to qualify for SIRS criteria  Lactic acid normal  1 of 2 bottles growing E. coli   CXR unremarkable  Patient afebrile and hemodynamically stable with no evidence of sepsis   S/p IV fluid boluses in the ED - remains hemodynamically stable now off IV fluids  Goal MAP > 65

## 2024-10-07 NOTE — PLAN OF CARE
Problem: Potential for Falls  Goal: Patient will remain free of falls  Description: INTERVENTIONS:  - Educate patient/family on patient safety including physical limitations  - Instruct patient to call for assistance with activity   - Consult OT/PT to assist with strengthening/mobility   - Keep Call bell within reach  - Keep bed low and locked with side rails adjusted as appropriate  - Keep care items and personal belongings within reach  - Initiate and maintain comfort rounds  - Make Fall Risk Sign visible to staff  - Offer Toileting every  Hours, in advance of need  - Initiate/Maintain alarm  - Obtain necessary fall risk management equipment:   - Apply yellow socks and bracelet for high fall risk patients  - Consider moving patient to room near nurses station  Outcome: Progressing     Problem: PAIN - ADULT  Goal: Verbalizes/displays adequate comfort level or baseline comfort level  Description: Interventions:  - Encourage patient to monitor pain and request assistance  - Assess pain using appropriate pain scale  - Administer analgesics based on type and severity of pain and evaluate response  - Implement non-pharmacological measures as appropriate and evaluate response  - Consider cultural and social influences on pain and pain management  - Notify physician/advanced practitioner if interventions unsuccessful or patient reports new pain  Outcome: Progressing     Problem: INFECTION - ADULT  Goal: Absence or prevention of progression during hospitalization  Description: INTERVENTIONS:  - Assess and monitor for signs and symptoms of infection  - Monitor lab/diagnostic results  - Monitor all insertion sites, i.e. indwelling lines, tubes, and drains  - Monitor endotracheal if appropriate and nasal secretions for changes in amount and color  - [place]ket appropriate cooling/warming therapies per order  - Administer medications as ordered  - Instruct and encourage patient and family to use good hand hygiene technique  -  Identify and instruct in appropriate isolation precautions for identified infection/condition  Outcome: Progressing     Problem: SAFETY ADULT  Goal: Patient will remain free of falls  Description: INTERVENTIONS:  - Educate patient/family on patient safety including physical limitations  - Instruct patient to call for assistance with activity   - Consult OT/PT to assist with strengthening/mobility   - Keep Call bell within reach  - Keep bed low and locked with side rails adjusted as appropriate  - Keep care items and personal belongings within reach  - Initiate and maintain comfort rounds  - Make Fall Risk Sign visible to staff  - Offer Toileting every  Hours, in advance of need  - Initiate/Maintain alarm  - Obtain necessary fall risk management equipment:   - Apply yellow socks and bracelet for high fall risk patients  - Consider moving patient to room near nurses station  Outcome: Progressing  Goal: Maintain or return to baseline ADL function  Description: INTERVENTIONS:  -  Assess patient's ability to carry out ADLs; assess patient's baseline for ADL function and identify physical deficits which impact ability to perform ADLs (bathing, care of mouth/teeth, toileting, grooming, dressing, etc.)  - Assess/evaluate cause of self-care deficits   - Assess range of motion  - Assess patient's mobility; develop plan if impaired  - Assess patient's need for assistive devices and provide as appropriate  - Encourage maximum independence but intervene and supervise when necessary  - Involve family in performance of ADLs  - Assess for home care needs following discharge   - Consider OT consult to assist with ADL evaluation and planning for discharge  - Provide patient education as appropriate  Outcome: Progressing  Goal: Maintains/Returns to pre admission functional level  Description: INTERVENTIONS:  - Perform AM-PAC 6 Click Basic Mobility/ Daily Activity assessment daily.  - Set and communicate daily mobility goal to care  team and patient/family/caregiver.   - Collaborate with rehabilitation services on mobility goals if consulted  - Perform Range of Motion  times a day.  - Reposition patient every  hours.  - Dangle patient  times a day  - Stand patient  times a day  - Ambulate patient  times a day  - Out of bed to chair  times a day   - Out of bed for meals  times a day  - Out of bed for toileting  - Record patient progress and toleration of activity level   Outcome: Progressing     Problem: DISCHARGE PLANNING  Goal: Discharge to home or other facility with appropriate resources  Description: INTERVENTIONS:  - Identify barriers to discharge w/patient and caregiver  - Arrange for needed discharge resources and transportation as appropriate  - Identify discharge learning needs (meds, wound care, etc.)  - Arrange for interpretive services to assist at discharge as needed  - Refer to Case Management Department for coordinating discharge planning if the patient needs post-hospital services based on physician/advanced practitioner order or complex needs related to functional status, cognitive ability, or social support system  Outcome: Progressing     Problem: Knowledge Deficit  Goal: Patient/family/caregiver demonstrates understanding of disease process, treatment plan, medications, and discharge instructions  Description: Complete learning assessment and assess knowledge base.  Interventions:  - Provide teaching at level of understanding  - Provide teaching via preferred learning methods  Outcome: Progressing     Problem: NEUROSENSORY - ADULT  Goal: Remains free of injury related to seizures activity  Description: INTERVENTIONS  - Maintain airway, patient safety  and administer oxygen as ordered  - Monitor patient for seizure activity, document and report duration and description of seizure to physician/advanced practitioner  - If seizure occurs,  ensure patient safety during seizure  - Reorient patient post seizure  - Seizure pads on  all 4 side rails  - Instruct patient/family to notify RN of any seizure activity including if an aura is experienced  - Instruct patient/family to call for assistance with activity based on nursing assessment  - Administer anti-seizure medications if ordered    Outcome: Progressing  Goal: Achieves maximal functionality and self care  Description: INTERVENTIONS  - Monitor swallowing and airway patency with patient fatigue and changes in neurological status  - Encourage and assist patient to increase activity and self care.   - Encourage visually impaired, hearing impaired and aphasic patients to use assistive/communication devices  Outcome: Progressing     Problem: RESPIRATORY - ADULT  Goal: Achieves optimal ventilation and oxygenation  Description: INTERVENTIONS:  - Assess for changes in respiratory status  - Assess for changes in mentation and behavior  - Position to facilitate oxygenation and minimize respiratory effort  - Oxygen administered by appropriate delivery if ordered  - Initiate smoking cessation education as indicated  - Encourage broncho-pulmonary hygiene including cough, deep breathe, Incentive Spirometry  - Assess the need for suctioning and aspirate as needed  - Assess and instruct to report SOB or any respiratory difficulty  - Respiratory Therapy support as indicated  Outcome: Progressing     Problem: GENITOURINARY - ADULT  Goal: Maintains or returns to baseline urinary function  Description: INTERVENTIONS:  - Assess urinary function  - Encourage oral fluids to ensure adequate hydration if ordered  - Administer IV fluids as ordered to ensure adequate hydration  - Administer ordered medications as needed  - Offer frequent toileting  - Follow urinary retention protocol if ordered  Outcome: Progressing

## 2024-10-07 NOTE — CASE MANAGEMENT
Case Management Assessment & Discharge Planning Note    Patient name Emily Martines  Location [address] 203/2 [facility]  MRN [id number]  : 1950 Date 10/7/2024       Current Admission Date: 10/4/2024  Current Admission Diagnosis:Pyelonephritis of right kidney   Patient Active Problem List    Diagnosis Date Noted Date Diagnosed    Gram-negative bacteremia 10/05/2024     Sepsis (HCC) 10/04/2024     Pyelonephritis of right kidney 10/04/2024     Hypomagnesemia 10/04/2024     Seizure disorder (HCC) 10/04/2024     History of traumatic brain injury 10/04/2024     Thrombocytopenia (HCC) 10/04/2024     Acute metabolic encephalopathy 10/04/2024     History of pulmonary embolus (PE) 2023     Primary hypertension 2023       LOS (days): 3  Geometric Mean LOS (GMLOS) (days): 3.8  Days to GMLOS:0.9     OBJECTIVE:    Risk of Unplanned Readmission Score: 9.42     Current admission status: Inpatient    Preferred Pharmacy:   [facility]/pharmacy #[id number] - [facility] [address] [address]  Phone: [phone] Fax: [phone]    Primary Care Provider: No primary care provider on file.    Primary Insurance: [facility]  Secondary Insurance:     ASSESSMENT:  Active Health Care Proxies    There are no active Health Care Proxies on file.       Advance Directives  Does patient have a Health Care POA?: Yes  Does patient have Advance Directives?: Yes  Advance Directives: Living will, Power of  for health care  Primary Contact: Carlos Meyer    Readmission Root Cause  30 Day Readmission: No    Patient Information  Admitted from:: Home  Mental Status: Alert  During Assessment patient was accompanied by: Not accompanied during assessment  Assessment information provided by:: Patient  Primary Caregiver: Family  Caregiver's Name:: Lasha Martines  Caregiver's Relationship to Patient:: Family Member ()  Caregiver's Telephone Number:: [phone]  Support  Systems: Self, Spouse/significant other, Daughter, Family members  County of Residence: [place]  What city do you live in?: [place]  Home entry access options. Select all that apply.: Stairs  Number of steps to enter home.: 2  Type of Current Residence: [facility]  Living Arrangements: Lives w/ Spouse/significant other, Lives w/ Daughter, Lives w/ Family members    Activities of Daily Living Prior to Admission  Completes ADLs independently?: Yes ( and daughter assist if needed)  Ambulates independently?: Yes  Does patient use assisted devices?: Yes  Assisted Devices (DME) used: Rollator, Shower Chair, Walker  Does patient currently own DME?: Yes  What DME does the patient currently own?: Rollator, Shower Chair, Straight Cane, Walker, Wheelchair  Does patient have a history of Outpatient Therapy (PT/OT)?: No  Does the patient have a history of Short-Term Rehab?: Yes  Does patient have a history of HHC?: Yes  Does patient currently have HHC?: No    Patient Information Continued  Income Source: Pension/FPC  Does patient have prescription coverage?: Yes  Does patient receive dialysis treatments?: No  Does patient have a history of substance abuse?: No  Does patient have a history of Mental Health Diagnosis?: No    Means of Transportation  Means of Transport to Appts:: Family transport      Social Determinants of Health (SDOH)      Flowsheet Row Most Recent Value   Housing Stability    In the last 12 months, was there a time when you were not able to pay the mortgage or rent on time? N   In the past 12 months, how many times have you moved where you were living? 1  [pt and  recently moved from [place], in with daughter and son-in-law]   At any time in the past 12 months, were you homeless or living in a shelter (including now)? N   Transportation Needs    In the past 12 months, has lack of transportation kept you from medical appointments or from getting medications? no   In the past 12 months,  has lack of transportation kept you from meetings, work, or from getting things needed for daily living? No   Food Insecurity    Within the past 12 months, you worried that your food would run out before you got the money to buy more. Never true   Within the past 12 months, the food you bought just didn't last and you didn't have money to get more. Never true   Utilities    In the past 12 months has the electric, gas, oil, or water company threatened to shut off services in your home? No            DISCHARGE DETAILS:    Discharge planning discussed with:: Patient  Freedom of Choice: Yes  Comments - Freedom of Choice: SW met with pt to assess needs and discuss plans.  Pt's plan is to return home with her daughter and family.  No discharge needs expressed or anticipated by pt at this time.  Pt said she is hoping to return home today.  Message left for pt's daughter to review plans and offer assistance if needed.  CM contacted family/caregiver?: Yes (message left)    Contacts  Patient Contacts: Carlos Meyer  Relationship to Patient:: Family (daughter)  Contact Method: Phone  Phone Number: [phone]  Reason/Outcome: Other (Comment) (left message)    Requested Home Health Care         Is the patient interested in HHC at discharge?: No    DME Referral Provided  Referral made for DME?: No    Other Referral/Resources/Interventions Provided:  Interventions: None Indicated    Treatment Team Recommendation: Home  Discharge Destination Plan:: Home  Transport at Discharge : Family    IMM Given (Date):: 10/07/24  IMM Given to:: Patient (IMM #2 reviewed with pt. Pt verbalized understanding and agrees with discharge determination.  Pt initialled IMM and copy given. Copy also placed in scan bin for chart.)

## 2024-10-07 NOTE — ASSESSMENT & PLAN NOTE
"As evidenced on CT imaging with associated cystitis  Urinalysis noting significant pyuria with moderate bacteria and positive nitrites  Given a dose of IV Levaquin in the ED due to concern over a listed allergy of \"hives\" to PCNs -> low cross-reactivity between PCNs and cephalosporins -> transitioned antibiotic coverage to IV Rocephin - can transition to an oral cephalosporin, per sensitivities.  Of note, patient did have a localized \"hive-type\" erythema and mild irritation around IV site to fluoroquinolone (Levaquin) administration - as stated above, antibiotics switched now   PRN pain/emesis control  Urine culture with growth of E. coli and Klebsiella pneumonia sensitive to cephalosporin  Patient received IV ceftriaxone x 3 days  Will discharge home on cefpodoxime 200 mg every 12 hours x 7 days to complete antibiotic treatment for pyelonephritis  "

## 2024-10-07 NOTE — ASSESSMENT & PLAN NOTE
1 of 2 bottles growing E. coli - likely associated with pyelonephritis/cystitis  On IV Rocephin  Discharging home on cefpodoxime p.o. 200 mg every 12 hours for a total of 7 days to complete 10 days of antibiotic treatment.

## 2024-10-07 NOTE — OCCUPATIONAL THERAPY NOTE
OCCUPATIONAL THERAPY       10/07/24 1205   OT Last Visit   OT Visit Date 10/07/24   Note Type   Note type Screen   Additional Comments Pt is being discharged today. Reports feeling much better and no need for OT eval.   Licensure   [place] License Number  [place] Talaver[place] MS, OTR/L, #[id number]

## 2024-10-07 NOTE — DISCHARGE SUMMARY
"Discharge Summary - Hospitalist   Name: Emily Martines 74 y.o. female I MRN: [id number]  Unit/Bed#: 2 [address] Date of Admission: 10/4/2024   Date of Service: 10/7/2024 I Hospital Day: 3     Assessment & Plan  Sepsis (HCC)  Presented with fever coupled with tachycardia/tachypnea and thrombocytopenia, in the setting of right pyelonephritis with cystitis (see below)  Also with a mild WBC count elevation, not high enough to qualify for SIRS criteria  Lactic acid normal  1 of 2 bottles growing E. coli   CXR unremarkable  Patient afebrile and hemodynamically stable with no evidence of sepsis   S/p IV fluid boluses in the ED - remains hemodynamically stable now off IV fluids  Goal MAP > 65   Pyelonephritis of right kidney  As evidenced on CT imaging with associated cystitis  Urinalysis noting significant pyuria with moderate bacteria and positive nitrites  Given a dose of IV Levaquin in the ED due to concern over a listed allergy of \"hives\" to PCNs -> low cross-reactivity between PCNs and cephalosporins -> transitioned antibiotic coverage to IV Rocephin - can transition to an oral cephalosporin, per sensitivities.  Of note, patient did have a localized \"hive-type\" erythema and mild irritation around IV site to fluoroquinolone (Levaquin) administration - as stated above, antibiotics switched now   PRN pain/emesis control  Urine culture with growth of E. coli and Klebsiella pneumonia sensitive to cephalosporin  Patient received IV ceftriaxone x 3 days  Will discharge home on cefpodoxime 200 mg every 12 hours x 7 days to complete antibiotic treatment for pyelonephritis  Gram-negative bacteremia  1 of 2 bottles growing E. coli - likely associated with pyelonephritis/cystitis  On IV Rocephin  Discharging home on cefpodoxime p.o. 200 mg every 12 hours for a total of 7 days to complete 10 days of antibiotic treatment.  Acute metabolic encephalopathy  Transient alteration of mentation in the setting of sepsis from " pyelonephritis/cystitis  Blood sugars stable  Electrolytes reviewed -> hypomagnesemia noted status post repletion  Serial neurochecks along with treatment of underlying infection  If altered mentation recurs or worsens, may consider CT imaging to rule out intracranial etiology (less likely clinical concern at this time)  Currently at baseline  History of pulmonary embolus (PE)  Continue Eliquis  Primary hypertension  Continue Coreg  Hypomagnesemia  Monitor/replete serum magnesium and potassium as necessary  Seizure disorder (HCC)  Continue Keppra - PRN IV Ativan on board for breakthrough episodes while hospitalized  Also on Clobazam at home (not available on hospital formulary)  History of traumatic brain injury status post craniotomy as precipitating factor noted  History of traumatic brain injury  Status post craniotomy in 2017  Thrombocytopenia (HCC)  Monitor platelet count - monitor for bleeding  Suspect associated with presenting sepsis     Medical Problems       Resolved Problems  Date Reviewed: 10/7/2024   None       Discharging Physician / Practitioner: PEDRO Conley  PCP: No primary care provider on file.  Admission Date:   Admission Orders (From admission, onward)       Ordered        10/04/24 1530  INPATIENT ADMISSION  Once                          Discharge Date: 10/07/24    Consultations During Hospital Stay:  None    Procedures Performed:   Chest x-ray: No acute cardiopulmonary disease  CT abdomen/pelvis: Circumferential Weitknecht mucosal hyperemia and perivesicular fat stranding suspicious for cystitis.    Significant Findings / Test Results:   As noted above      Complications: None    Reason for Admission: Weakness with confusion    Hospital Course:   Emily Martines is a 74 y.o. female patient who originally presented to the hospital on 10/4/2024 due to above. Noted with mild WBC elevation, tachycardia/tachypnea, though not enough to meet SIRS criteria.  Lactic acid normal and chest x-ray  "unremarkable.  1 of 2 bottles of blood culture growing E. coli patient received a dose of IV Levaquin in ED and was discontinued due to listed allergy of hives to PCN's urine culture with growth of E. coli and Klebsiella pneumonia.  Patient received 3 days of IV ceftriaxone and will be discharged home on Vantin x 7 days to complete antibiotic treatment for pyelonephritis.  Noted with acute metabolic encephalopathy on admission which is likely secondary to UTI.  Patient has been discharged home in stable condition.  Denies any pain, shortness of breath, nausea or vomiting.          Please see above list of diagnoses and related plan for additional information.     Condition at Discharge: stable    Discharge Day Visit / Exam:     Subjective: Seen and examined with daughter at bedside.  Offers no complaints  Vitals: Blood Pressure: 146/72 (10/07/24 1505)  Pulse: 61 (10/07/24 1505)  Temperature: 97.7 °F (36.5 °C) (10/07/24 1505)  Temp Source: Oral (10/07/24 0800)  Respirations: 18 (10/07/24 1505)  Height: 5' 8\" (172.7 cm) (10/04/24 2024)  Weight - Scale: 105 kg (231 lb 7.7 oz) (10/04/24 2024)  SpO2: 96 % (10/07/24 1505)    Physical Exam  Vitals and nursing note reviewed.   Constitutional:       General: She is not in acute distress.     Appearance: She is well-developed.   HENT:      Head: Normocephalic and atraumatic.   Eyes:      Conjunctiva/sclera: Conjunctivae normal.   Cardiovascular:      Rate and Rhythm: Normal rate and regular rhythm.      Heart sounds: No murmur heard.  Pulmonary:      Effort: Pulmonary effort is normal. No respiratory distress.      Breath sounds: Normal breath sounds.   Abdominal:      Palpations: Abdomen is soft.      Tenderness: There is no abdominal tenderness.   Musculoskeletal:         General: No swelling.      Cervical back: Neck supple.   Skin:     General: Skin is warm and dry.      Capillary Refill: Capillary refill takes less than 2 seconds.   Neurological:      Mental Status: She " is alert.   Psychiatric:         Mood and Affect: Mood normal.          Discussion with Family: Updated  (daughter) at bedside.    Discharge instructions/Information to patient and family:   See after visit summary for information provided to patient and family.      Provisions for Follow-Up Care:  See after visit summary for information related to follow-up care and any pertinent home health orders.      Mobility at time of Discharge:   Basic Mobility Inpatient Raw Score: 21  JH-HLM Goal: 6: Walk 10 steps or more  JH-HLM Achieved: 1: Laying in bed  HLM Goal achieved. Continue to encourage appropriate mobility.     Disposition:   Home    Planned Readmission: No    Discharge Medications:  See after visit summary for reconciled discharge medications provided to patient and/or family.      Administrative Statements   Discharge Statement:  I have spent a total time of  minutes in caring for this patient on the day of the visit/encounter. .    **Please Note: This note may have been constructed using a voice recognition system**

## 2024-10-08 NOTE — UTILIZATION REVIEW
NOTIFICATION OF ADMISSION DISCHARGE   This is a Notification of Discharge from [facility]. Please be advised that this patient has been discharge from our facility. Below you will find the admission and discharge date and time including the patient’s disposition.   UTILIZATION REVIEW CONTACT:  Georgette Cueva  Utilization   Network Utilization Review Department  Phone: [phone] x carefully listen to the prompts. All voicemails are confidential.  Email: [email or web address]     ADMISSION INFORMATION  PRESENTATION DATE: 10/4/2024 12:54 PM  OBERVATION ADMISSION DATE: N/A  INPATIENT ADMISSION DATE: 10/4/24  3:30 PM   DISCHARGE DATE: 10/7/2024  4:43 PM   DISPOSITION:Home/Self Care    Network Utilization Review Department  ATTENTION: Please call with any questions or concerns to [phone] and carefully listen to the prompts so that you are directed to the right person. All voicemails are confidential.   For Discharge needs, contact Care Management DC Support Team at [phone] opt. 2  Send all requests for admission clinical reviews, approved or denied determinations and any other requests to dedicated fax number below belonging to the campus where the patient is receiving treatment. List of dedicated fax numbers for the Facilities:  FACILITY NAME UR FAX NUMBER   ADMISSION DENIALS (Administrative/Medical Necessity) [phone]   DISCHARGE SUPPORT TEAM ([facility]) [phone]   PARENT CHILD HEALTH (Maternity/NICU/Pediatrics) [phone]   [facility] [phone]   [facility] [phone]   [facility] [phone]   [facility] [phone]   [facility] [phone]   [facility] [phone]   [facility] [phone]   [facility]  [phone]   [facility] [phone]   [facility] [phone]   [facility] [phone]   [facility] [phone]

## 2024-10-09 LAB — BACTERIA BLD CULT: NORMAL

## 2024-11-06 PROBLEM — N12 PYELONEPHRITIS OF RIGHT KIDNEY: Status: RESOLVED | Noted: 2024-10-04 | Resolved: 2024-11-06

## 2025-05-22 ENCOUNTER — OFFICE VISIT (OUTPATIENT)
Age: 75
End: 2025-05-22
Payer: COMMERCIAL

## 2025-05-22 VITALS — BODY MASS INDEX: 35.01 KG/M2 | HEIGHT: 68 IN | RESPIRATION RATE: 18 BRPM | WEIGHT: 231 LBS

## 2025-05-22 DIAGNOSIS — R60.9 CHRONIC EDEMA: ICD-10-CM

## 2025-05-22 DIAGNOSIS — M77.41 METATARSALGIA OF BOTH FEET: Primary | ICD-10-CM

## 2025-05-22 DIAGNOSIS — M20.41 HAMMER TOES OF BOTH FEET: ICD-10-CM

## 2025-05-22 DIAGNOSIS — M54.16 RADICULOPATHY OF LUMBAR REGION: ICD-10-CM

## 2025-05-22 DIAGNOSIS — M77.42 METATARSALGIA OF BOTH FEET: Primary | ICD-10-CM

## 2025-05-22 DIAGNOSIS — M21.961 ACQUIRED DEFORMITY OF BOTH FEET: ICD-10-CM

## 2025-05-22 DIAGNOSIS — I87.2 STASIS DERMATITIS OF BOTH LEGS: ICD-10-CM

## 2025-05-22 DIAGNOSIS — M20.42 HAMMER TOES OF BOTH FEET: ICD-10-CM

## 2025-05-22 DIAGNOSIS — M21.962 ACQUIRED DEFORMITY OF BOTH FEET: ICD-10-CM

## 2025-05-22 PROCEDURE — 99204 OFFICE O/P NEW MOD 45 MIN: CPT | Performed by: PODIATRIST

## 2025-05-22 RX ORDER — GABAPENTIN 100 MG/1
100 CAPSULE ORAL
Qty: 30 CAPSULE | Refills: 1 | Status: SHIPPED | OUTPATIENT
Start: 2025-05-22 | End: 2025-07-21

## 2025-05-22 NOTE — PROGRESS NOTES
Assessment/Plan: Pain upon ambulation.  Acquired deformity foot bilateral.  Hammertoe formation second bilateral.  Metatarsalgia secondary to radiculopathy.  Chronic edema.  Stasis dermatitis bilateral.  Negative ulceration.  Rule out deep venous insufficiency.    Plan.  Chart reviewed.  PCP notes reviewed.  Patient evaluated.  At this time we need to rule out venous stasis secondary to insufficiency, venous Doppler ordered.  Patient may need referral to vascular surgery.  In order to help with radicular pain and symptoms, we will add gabapentin.  Patient and family advised on obtaining extra-depth shoes.  Patient may need hammertoe surgery.  Aftercare instruction given.  Watch for signs of infection.  Return for follow-up.         Diagnoses and all orders for this visit:    Metatarsalgia of both feet  -     gabapentin (NEURONTIN) 100 mg capsule; Take 1 capsule (100 mg total) by mouth daily at bedtime    Radiculopathy of lumbar region  -     gabapentin (NEURONTIN) 100 mg capsule; Take 1 capsule (100 mg total) by mouth daily at bedtime    Acquired deformity of both feet    Chronic edema  -     VAS Lower extremity venous insufficiency duplex, bilateral w/ measurements; Future    Stasis dermatitis of both legs  -     VAS Lower extremity venous insufficiency duplex, bilateral w/ measurements; Future    Hammer toes of both feet          Subjective: Patient has multiple complaints.  She is concerned about swollen legs.  She also gets pain and numbness in her feet.  Patient has chronic low back pain.  She is also concerned about the position of her toes.    Allergies   Allergen Reactions    Doxycycline Hives    Ketamine Anxiety     Severe hallucinations.    Morphine And Codeine Hives    Penicillins Hives    Levaquin [Levofloxacin] Rash     Redness and blister to IV site    Clindamycin Hives    Codeine Hives    Lacosamide Hives    Latex Hives    Valproic Acid Hives    Erythromycin Rash       Current  Medications[1]    Problem List[2]       Patient ID: Emily Martines is a 75 y.o. female.    HPI    The following portions of the patient's history were reviewed and updated as appropriate:     family history is not on file.      reports that she has never smoked. She has never used smokeless tobacco. She reports that she does not currently use alcohol. She reports that she does not use drugs.    Vitals:    05/22/25 1552   Resp: 18       Review of Systems      Objective:  Patient's shoes and socks removed.   Foot Exam    General  General Appearance: appears stated age and healthy   Orientation: alert and oriented to person, place, and time   Affect: appropriate   Gait: antalgic   Assistance: walker use       Right Foot/Ankle     Inspection and Palpation  Tenderness: metatarsals   Swelling: dorsum   Arch: pes cavus  Hammertoes: second toe and fifth toe  Skin Exam: erythema;     Neurovascular  Dorsalis pedis: 2+  Posterior tibial: 2+  Saphenous nerve sensation: diminished  Tibial nerve sensation: diminished  Superficial peroneal nerve sensation: absent  Deep peroneal nerve sensation: absent  Sural nerve sensation: absent    Muscle Strength  Ankle dorsiflexion: 4      Left Foot/Ankle      Inspection and Palpation  Tenderness: metatarsals   Swelling: dorsum   Arch: pes cavus  Hammertoes: second toe and fifth toe  Skin Exam: erythema;     Neurovascular  Dorsalis pedis: 2+  Posterior tibial: 2+  Saphenous nerve sensation: diminished  Tibial nerve sensation: diminished  Superficial peroneal nerve sensation: diminished  Deep peroneal nerve sensation: diminished  Sural nerve sensation: diminished    Muscle Strength  Ankle dorsiflexion: 5      Physical Exam  Vitals and nursing note reviewed.   Constitutional:       Appearance: Normal appearance.     Cardiovascular:      Rate and Rhythm: Normal rate and regular rhythm.      Pulses:           Dorsalis pedis pulses are 2+ on the right side and 2+ on the left side.        Posterior  tibial pulses are 2+ on the right side and 2+ on the left side.      Comments: Patient demonstrates venous stasis dermatitis bilateral.  Negative ulceration.  Negative cellulitis    Musculoskeletal:      Right lower leg: 3+ Pitting Edema present.      Left lower leg: 3+ Pitting Edema present.   Feet:      Right foot:      Skin integrity: Erythema present.      Left foot:      Skin integrity: Erythema present.     Skin:     Capillary Refill: Capillary refill takes less than 2 seconds.     Neurological:      Mental Status: She is alert.      Comments: Patient has significant decrease in vibratory sensation bilateral.   Psychiatric:         Mood and Affect: Mood normal.                          [1]   Current Outpatient Medications:     apixaban (Eliquis) 2.5 mg, Take 2.5 mg by mouth in the morning and 2.5 mg in the evening., Disp: , Rfl:     carvedilol (COREG) 12.5 mg tablet, Take 6.25 mg by mouth in the morning and 6.25 mg in the evening., Disp: , Rfl:     cloBAZam 10 MG FILM, Take 20 mg by mouth daily at bedtime, Disp: , Rfl:     gabapentin (NEURONTIN) 100 mg capsule, Take 1 capsule (100 mg total) by mouth daily at bedtime, Disp: 30 capsule, Rfl: 1    levETIRAcetam (KEPPRA) 500 mg tablet, Take 750 mg by mouth in the morning and 750 mg in the evening., Disp: , Rfl:     LORazepam (ATIVAN) 0.5 mg tablet, Take 2.5 mg by mouth if needed for anxiety Only for seizure lasting 3 min, Disp: , Rfl:     Pyridoxine HCl (vitamin B-6) 25 MG tablet, Take 100 mg by mouth in the morning., Disp: , Rfl:   [2]   Patient Active Problem List  Diagnosis    History of pulmonary embolus (PE)    Primary hypertension    Hypomagnesemia    Seizure disorder (HCC)    History of traumatic brain injury    Thrombocytopenia (HCC)    Acute metabolic encephalopathy    Gram-negative bacteremia      English

## 2025-06-28 DIAGNOSIS — M77.41 METATARSALGIA OF BOTH FEET: ICD-10-CM

## 2025-06-28 DIAGNOSIS — M77.42 METATARSALGIA OF BOTH FEET: ICD-10-CM

## 2025-06-28 DIAGNOSIS — M54.16 RADICULOPATHY OF LUMBAR REGION: ICD-10-CM

## 2025-06-30 RX ORDER — GABAPENTIN 100 MG/1
100 CAPSULE ORAL
Qty: 30 CAPSULE | Refills: 1 | Status: SHIPPED | OUTPATIENT
Start: 2025-06-30

## 2025-07-28 ENCOUNTER — TELEPHONE (OUTPATIENT)
Dept: OBGYN CLINIC | Facility: CLINIC | Age: 75
End: 2025-07-28